# Patient Record
Sex: MALE | Race: WHITE | HISPANIC OR LATINO | URBAN - METROPOLITAN AREA
[De-identification: names, ages, dates, MRNs, and addresses within clinical notes are randomized per-mention and may not be internally consistent; named-entity substitution may affect disease eponyms.]

---

## 2017-01-01 ENCOUNTER — INPATIENT (INPATIENT)
Facility: HOSPITAL | Age: 77
LOS: 7 days | End: 2018-01-08
Attending: INTERNAL MEDICINE | Admitting: HOSPITALIST
Payer: MEDICARE

## 2017-01-01 VITALS
OXYGEN SATURATION: 100 % | TEMPERATURE: 98 F | SYSTOLIC BLOOD PRESSURE: 157 MMHG | RESPIRATION RATE: 18 BRPM | DIASTOLIC BLOOD PRESSURE: 112 MMHG | HEART RATE: 107 BPM

## 2017-01-01 DIAGNOSIS — I10 ESSENTIAL (PRIMARY) HYPERTENSION: ICD-10-CM

## 2017-01-01 DIAGNOSIS — R74.0 NONSPECIFIC ELEVATION OF LEVELS OF TRANSAMINASE AND LACTIC ACID DEHYDROGENASE [LDH]: ICD-10-CM

## 2017-01-01 DIAGNOSIS — E11.9 TYPE 2 DIABETES MELLITUS WITHOUT COMPLICATIONS: ICD-10-CM

## 2017-01-01 DIAGNOSIS — I63.9 CEREBRAL INFARCTION, UNSPECIFIED: ICD-10-CM

## 2017-01-01 DIAGNOSIS — T14.8XXA OTHER INJURY OF UNSPECIFIED BODY REGION, INITIAL ENCOUNTER: ICD-10-CM

## 2017-01-01 DIAGNOSIS — D72.829 ELEVATED WHITE BLOOD CELL COUNT, UNSPECIFIED: ICD-10-CM

## 2017-01-01 DIAGNOSIS — I24.9 ACUTE ISCHEMIC HEART DISEASE, UNSPECIFIED: ICD-10-CM

## 2017-01-01 LAB
ALBUMIN SERPL ELPH-MCNC: 4.2 G/DL — SIGNIFICANT CHANGE UP (ref 3.3–5)
ALBUMIN SERPL ELPH-MCNC: 4.3 G/DL — SIGNIFICANT CHANGE UP (ref 3.3–5)
ALP SERPL-CCNC: 61 U/L — SIGNIFICANT CHANGE UP (ref 40–120)
ALP SERPL-CCNC: 62 U/L — SIGNIFICANT CHANGE UP (ref 40–120)
ALT FLD-CCNC: 105 U/L — HIGH (ref 4–41)
ALT FLD-CCNC: 113 U/L — HIGH (ref 4–41)
APTT BLD: 28.6 SEC — SIGNIFICANT CHANGE UP (ref 27.5–37.4)
AST SERPL-CCNC: 102 U/L — HIGH (ref 4–40)
AST SERPL-CCNC: 86 U/L — HIGH (ref 4–40)
BASE EXCESS BLDV CALC-SCNC: -2.1 MMOL/L — SIGNIFICANT CHANGE UP
BASOPHILS # BLD AUTO: 0.03 K/UL — SIGNIFICANT CHANGE UP (ref 0–0.2)
BASOPHILS NFR BLD AUTO: 0.2 % — SIGNIFICANT CHANGE UP (ref 0–2)
BILIRUB SERPL-MCNC: 1.5 MG/DL — HIGH (ref 0.2–1.2)
BILIRUB SERPL-MCNC: 1.5 MG/DL — HIGH (ref 0.2–1.2)
BLOOD GAS VENOUS - CREATININE: 1.03 MG/DL — SIGNIFICANT CHANGE UP (ref 0.5–1.3)
BUN SERPL-MCNC: 26 MG/DL — HIGH (ref 7–23)
BUN SERPL-MCNC: 29 MG/DL — HIGH (ref 7–23)
CALCIUM SERPL-MCNC: 9.4 MG/DL — SIGNIFICANT CHANGE UP (ref 8.4–10.5)
CALCIUM SERPL-MCNC: 9.5 MG/DL — SIGNIFICANT CHANGE UP (ref 8.4–10.5)
CHLORIDE BLDV-SCNC: 103 MMOL/L — SIGNIFICANT CHANGE UP (ref 96–108)
CHLORIDE SERPL-SCNC: 98 MMOL/L — SIGNIFICANT CHANGE UP (ref 98–107)
CHLORIDE SERPL-SCNC: 99 MMOL/L — SIGNIFICANT CHANGE UP (ref 98–107)
CK MB BLD-MCNC: 14.6 NG/ML — HIGH (ref 1–6.6)
CK MB BLD-MCNC: 17.93 NG/ML — HIGH (ref 1–6.6)
CK MB BLD-MCNC: 6.6 — HIGH (ref 0–2.5)
CK MB BLD-MCNC: 7.1 — HIGH (ref 0–2.5)
CK SERPL-CCNC: 220 U/L — HIGH (ref 30–200)
CK SERPL-CCNC: 254 U/L — HIGH (ref 30–200)
CO2 SERPL-SCNC: 20 MMOL/L — LOW (ref 22–31)
CO2 SERPL-SCNC: 22 MMOL/L — SIGNIFICANT CHANGE UP (ref 22–31)
CREAT SERPL-MCNC: 0.99 MG/DL — SIGNIFICANT CHANGE UP (ref 0.5–1.3)
CREAT SERPL-MCNC: 1.11 MG/DL — SIGNIFICANT CHANGE UP (ref 0.5–1.3)
EOSINOPHIL # BLD AUTO: 0 K/UL — SIGNIFICANT CHANGE UP (ref 0–0.5)
EOSINOPHIL NFR BLD AUTO: 0 % — SIGNIFICANT CHANGE UP (ref 0–6)
GAS PNL BLDV: 137 MMOL/L — SIGNIFICANT CHANGE UP (ref 136–146)
GLUCOSE BLDV-MCNC: 194 — HIGH (ref 70–99)
GLUCOSE SERPL-MCNC: 144 MG/DL — HIGH (ref 70–99)
GLUCOSE SERPL-MCNC: 178 MG/DL — HIGH (ref 70–99)
HBA1C BLD-MCNC: 6.6 % — HIGH (ref 4–5.6)
HCO3 BLDV-SCNC: 21 MMOL/L — SIGNIFICANT CHANGE UP (ref 20–27)
HCT VFR BLD CALC: 49 % — SIGNIFICANT CHANGE UP (ref 39–50)
HCT VFR BLDV CALC: 50.7 % — SIGNIFICANT CHANGE UP (ref 39–51)
HGB BLD-MCNC: 16.3 G/DL — SIGNIFICANT CHANGE UP (ref 13–17)
HGB BLDV-MCNC: 16.6 G/DL — SIGNIFICANT CHANGE UP (ref 13–17)
IMM GRANULOCYTES # BLD AUTO: 0.08 # — SIGNIFICANT CHANGE UP
IMM GRANULOCYTES NFR BLD AUTO: 0.5 % — SIGNIFICANT CHANGE UP (ref 0–1.5)
INR BLD: 1.36 — HIGH (ref 0.88–1.17)
LACTATE BLDV-MCNC: 3.5 MMOL/L — HIGH (ref 0.5–2)
LYMPHOCYTES # BLD AUTO: 1.77 K/UL — SIGNIFICANT CHANGE UP (ref 1–3.3)
LYMPHOCYTES # BLD AUTO: 11 % — LOW (ref 13–44)
MCHC RBC-ENTMCNC: 31.2 PG — SIGNIFICANT CHANGE UP (ref 27–34)
MCHC RBC-ENTMCNC: 33.3 % — SIGNIFICANT CHANGE UP (ref 32–36)
MCV RBC AUTO: 93.9 FL — SIGNIFICANT CHANGE UP (ref 80–100)
MONOCYTES # BLD AUTO: 1.59 K/UL — HIGH (ref 0–0.9)
MONOCYTES NFR BLD AUTO: 9.9 % — SIGNIFICANT CHANGE UP (ref 2–14)
NEUTROPHILS # BLD AUTO: 12.57 K/UL — HIGH (ref 1.8–7.4)
NEUTROPHILS NFR BLD AUTO: 78.4 % — HIGH (ref 43–77)
NRBC # FLD: 0.02 — SIGNIFICANT CHANGE UP
PCO2 BLDV: 44 MMHG — SIGNIFICANT CHANGE UP (ref 41–51)
PH BLDV: 7.34 PH — SIGNIFICANT CHANGE UP (ref 7.32–7.43)
PLATELET # BLD AUTO: 211 K/UL — SIGNIFICANT CHANGE UP (ref 150–400)
PMV BLD: 10.8 FL — SIGNIFICANT CHANGE UP (ref 7–13)
PO2 BLDV: 27 MMHG — LOW (ref 35–40)
POTASSIUM BLDV-SCNC: 4.4 MMOL/L — SIGNIFICANT CHANGE UP (ref 3.4–4.5)
POTASSIUM SERPL-MCNC: 4.2 MMOL/L — SIGNIFICANT CHANGE UP (ref 3.5–5.3)
POTASSIUM SERPL-MCNC: 4.9 MMOL/L — SIGNIFICANT CHANGE UP (ref 3.5–5.3)
POTASSIUM SERPL-SCNC: 4.2 MMOL/L — SIGNIFICANT CHANGE UP (ref 3.5–5.3)
POTASSIUM SERPL-SCNC: 4.9 MMOL/L — SIGNIFICANT CHANGE UP (ref 3.5–5.3)
PROT SERPL-MCNC: 7.4 G/DL — SIGNIFICANT CHANGE UP (ref 6–8.3)
PROT SERPL-MCNC: 7.5 G/DL — SIGNIFICANT CHANGE UP (ref 6–8.3)
PROTHROM AB SERPL-ACNC: 15.2 SEC — HIGH (ref 9.8–13.1)
RBC # BLD: 5.22 M/UL — SIGNIFICANT CHANGE UP (ref 4.2–5.8)
RBC # FLD: 14.7 % — HIGH (ref 10.3–14.5)
SAO2 % BLDV: 35.8 % — LOW (ref 60–85)
SODIUM SERPL-SCNC: 139 MMOL/L — SIGNIFICANT CHANGE UP (ref 135–145)
SODIUM SERPL-SCNC: 140 MMOL/L — SIGNIFICANT CHANGE UP (ref 135–145)
TROPONIN T SERPL-MCNC: 0.31 NG/ML — HIGH (ref 0–0.06)
TROPONIN T SERPL-MCNC: 0.41 NG/ML — HIGH (ref 0–0.06)
WBC # BLD: 16.04 K/UL — HIGH (ref 3.8–10.5)
WBC # FLD AUTO: 16.04 K/UL — HIGH (ref 3.8–10.5)

## 2017-01-01 PROCEDURE — 71020: CPT | Mod: 26

## 2017-01-01 PROCEDURE — 70450 CT HEAD/BRAIN W/O DYE: CPT | Mod: 26

## 2017-01-01 PROCEDURE — 93010 ELECTROCARDIOGRAM REPORT: CPT

## 2017-01-01 RX ORDER — ASPIRIN/CALCIUM CARB/MAGNESIUM 324 MG
324 TABLET ORAL ONCE
Qty: 0 | Refills: 0 | Status: COMPLETED | OUTPATIENT
Start: 2017-01-01 | End: 2017-01-01

## 2017-01-01 RX ORDER — SITAGLIPTIN 50 MG/1
1 TABLET, FILM COATED ORAL
Qty: 0 | Refills: 0 | COMMUNITY

## 2017-01-01 RX ORDER — SODIUM CHLORIDE 9 MG/ML
1000 INJECTION, SOLUTION INTRAVENOUS
Qty: 0 | Refills: 0 | Status: DISCONTINUED | OUTPATIENT
Start: 2017-01-01 | End: 2018-01-01

## 2017-01-01 RX ORDER — SODIUM CHLORIDE 9 MG/ML
1000 INJECTION INTRAMUSCULAR; INTRAVENOUS; SUBCUTANEOUS
Qty: 0 | Refills: 0 | Status: DISCONTINUED | OUTPATIENT
Start: 2017-01-01 | End: 2018-01-01

## 2017-01-01 RX ORDER — DEXTROSE 50 % IN WATER 50 %
1 SYRINGE (ML) INTRAVENOUS ONCE
Qty: 0 | Refills: 0 | Status: DISCONTINUED | OUTPATIENT
Start: 2017-01-01 | End: 2018-01-01

## 2017-01-01 RX ORDER — HEPARIN SODIUM 5000 [USP'U]/ML
5000 INJECTION INTRAVENOUS; SUBCUTANEOUS ONCE
Qty: 0 | Refills: 0 | Status: COMPLETED | OUTPATIENT
Start: 2017-01-01 | End: 2017-01-01

## 2017-01-01 RX ORDER — INSULIN LISPRO 100/ML
VIAL (ML) SUBCUTANEOUS AT BEDTIME
Qty: 0 | Refills: 0 | Status: DISCONTINUED | OUTPATIENT
Start: 2017-01-01 | End: 2018-01-01

## 2017-01-01 RX ORDER — SIMVASTATIN 20 MG/1
1 TABLET, FILM COATED ORAL
Qty: 0 | Refills: 0 | COMMUNITY

## 2017-01-01 RX ORDER — CANAGLIFLOZIN AND METFORMIN HYDROCHLORIDE 50; 500 MG/1; MG/1
1 TABLET, FILM COATED, EXTENDED RELEASE ORAL
Qty: 0 | Refills: 0 | COMMUNITY

## 2017-01-01 RX ORDER — HEPARIN SODIUM 5000 [USP'U]/ML
INJECTION INTRAVENOUS; SUBCUTANEOUS
Qty: 25000 | Refills: 0 | Status: DISCONTINUED | OUTPATIENT
Start: 2017-01-01 | End: 2017-01-01

## 2017-01-01 RX ORDER — GLUCAGON INJECTION, SOLUTION 0.5 MG/.1ML
1 INJECTION, SOLUTION SUBCUTANEOUS ONCE
Qty: 0 | Refills: 0 | Status: DISCONTINUED | OUTPATIENT
Start: 2017-01-01 | End: 2018-01-01

## 2017-01-01 RX ORDER — DEXTROSE 50 % IN WATER 50 %
25 SYRINGE (ML) INTRAVENOUS ONCE
Qty: 0 | Refills: 0 | Status: DISCONTINUED | OUTPATIENT
Start: 2017-01-01 | End: 2018-01-01

## 2017-01-01 RX ORDER — INFLUENZA VIRUS VACCINE 15; 15; 15; 15 UG/.5ML; UG/.5ML; UG/.5ML; UG/.5ML
0.5 SUSPENSION INTRAMUSCULAR ONCE
Qty: 0 | Refills: 0 | Status: DISCONTINUED | OUTPATIENT
Start: 2017-01-01 | End: 2018-01-01

## 2017-01-01 RX ORDER — HEPARIN SODIUM 5000 [USP'U]/ML
5000 INJECTION INTRAVENOUS; SUBCUTANEOUS EVERY 6 HOURS
Qty: 0 | Refills: 0 | Status: DISCONTINUED | OUTPATIENT
Start: 2017-01-01 | End: 2017-01-01

## 2017-01-01 RX ORDER — METOPROLOL TARTRATE 50 MG
50 TABLET ORAL
Qty: 0 | Refills: 0 | Status: DISCONTINUED | OUTPATIENT
Start: 2017-01-01 | End: 2018-01-01

## 2017-01-01 RX ORDER — HEPARIN SODIUM 5000 [USP'U]/ML
5000 INJECTION INTRAVENOUS; SUBCUTANEOUS EVERY 12 HOURS
Qty: 0 | Refills: 0 | Status: DISCONTINUED | OUTPATIENT
Start: 2017-01-01 | End: 2018-01-01

## 2017-01-01 RX ORDER — DEXTROSE 50 % IN WATER 50 %
12.5 SYRINGE (ML) INTRAVENOUS ONCE
Qty: 0 | Refills: 0 | Status: DISCONTINUED | OUTPATIENT
Start: 2017-01-01 | End: 2018-01-01

## 2017-01-01 RX ORDER — SIMVASTATIN 20 MG/1
40 TABLET, FILM COATED ORAL AT BEDTIME
Qty: 0 | Refills: 0 | Status: DISCONTINUED | OUTPATIENT
Start: 2017-01-01 | End: 2018-01-01

## 2017-01-01 RX ORDER — ASPIRIN/CALCIUM CARB/MAGNESIUM 324 MG
1 TABLET ORAL
Qty: 0 | Refills: 0 | COMMUNITY

## 2017-01-01 RX ORDER — ASPIRIN/CALCIUM CARB/MAGNESIUM 324 MG
81 TABLET ORAL DAILY
Qty: 0 | Refills: 0 | Status: DISCONTINUED | OUTPATIENT
Start: 2017-01-01 | End: 2018-01-01

## 2017-01-01 RX ORDER — INSULIN LISPRO 100/ML
VIAL (ML) SUBCUTANEOUS
Qty: 0 | Refills: 0 | Status: DISCONTINUED | OUTPATIENT
Start: 2017-01-01 | End: 2018-01-01

## 2017-01-01 RX ORDER — METOPROLOL TARTRATE 50 MG
1 TABLET ORAL
Qty: 0 | Refills: 0 | COMMUNITY

## 2017-01-01 RX ADMIN — HEPARIN SODIUM 1000 UNIT(S)/HR: 5000 INJECTION INTRAVENOUS; SUBCUTANEOUS at 14:35

## 2017-01-01 RX ADMIN — HEPARIN SODIUM 5000 UNIT(S): 5000 INJECTION INTRAVENOUS; SUBCUTANEOUS at 14:35

## 2017-01-01 RX ADMIN — HEPARIN SODIUM 5000 UNIT(S): 5000 INJECTION INTRAVENOUS; SUBCUTANEOUS at 18:55

## 2017-01-01 RX ADMIN — Medication 324 MILLIGRAM(S): at 14:20

## 2017-01-01 RX ADMIN — SODIUM CHLORIDE 50 MILLILITER(S): 9 INJECTION INTRAMUSCULAR; INTRAVENOUS; SUBCUTANEOUS at 23:30

## 2017-12-31 NOTE — CHART NOTE - NSCHARTNOTEFT_GEN_A_CORE
Discussed case with cardiology, will hold off on heparin gtt for now since CK and CKMB trending down and EKG is stable. Pt remains chest pain free. Will continue to trend CE.

## 2017-12-31 NOTE — ED ADULT NURSE NOTE - OBJECTIVE STATEMENT
Pt received to room 3, As per family at bedside pt fell, landing onto the bed, denies LOC or hitting his head on Thursday, pt refused to come to ED. Pt complaining of intermittent periods of chest pain, denies any at this time. Pt noted to have slurred speech, as per family they noticed slurred speech since Thursday. Pt placed on cardiac monitor. IV access obtained, labs drawn and sent.

## 2017-12-31 NOTE — ED PROVIDER NOTE - MEDICAL DECISION MAKING DETAILS
77M s/p fall with ataxia and dizziness, r/o stroke, electrolyte abnormalities. check labs, ekg, head ct.

## 2017-12-31 NOTE — CONSULT NOTE ADULT - SUBJECTIVE AND OBJECTIVE BOX
Date of Admission:    CHIEF COMPLAINT:    HISTORY OF PRESENT ILLNESS:      Allergies    No Known Allergies    Intolerances    	    MEDICATIONS:  heparin  Infusion.  Unit(s)/Hr IV Continuous <Continuous>  heparin  Injectable 5000 Unit(s) IV Push every 6 hours PRN                  PAST MEDICAL & SURGICAL HISTORY:  DM (diabetes mellitus)  HTN (hypertension)  No significant past surgical history      FAMILY HISTORY:  No pertinent family history in first degree relatives      SOCIAL HISTORY:    [ ] Non-smoker  [ ] Smoker  [ ] Alcohol      REVIEW OF SYSTEMS:  See HPI. Otherwise, 10 point ROS done and otherwise negative.    PHYSICAL EXAM:  T(C): 37.1 (12-31-17 @ 13:02), Max: 37.1 (12-31-17 @ 13:02)  HR: 100 (12-31-17 @ 13:02) (100 - 107)  BP: 161/105 (12-31-17 @ 13:02) (157/112 - 161/105)  RR: 20 (12-31-17 @ 13:02) (18 - 20)  SpO2: 99% (12-31-17 @ 13:02) (99% - 100%)  Wt(kg): --  I&O's Summary      Appearance: Normal	  HEENT:   Normal oral mucosa, PERRL, EOMI	  Lymphatic: No lymphadenopathy  Cardiovascular: Normal S1 S2, No JVD, No murmurs, No edema  Respiratory: Lungs clear to auscultation	  Psychiatry: A & O x 3, Mood & affect appropriate  Gastrointestinal:  Soft, Non-tender, + BS	  Skin: No rashes, No ecchymoses, No cyanosis	  Neurologic: Non-focal  Extremities: Normal range of motion, No clubbing, cyanosis or edema  Vascular: Peripheral pulses palpable 2+ bilaterally        LABS:	 	    CBC Full  -  ( 31 Dec 2017 13:01 )  WBC Count : 16.04 K/uL  Hemoglobin : 16.3 g/dL  Hematocrit : 49.0 %  Platelet Count - Automated : 211 K/uL  Mean Cell Volume : 93.9 fL  Mean Cell Hemoglobin : 31.2 pg  Mean Cell Hemoglobin Concentration : 33.3 %  Auto Neutrophil # : 12.57 K/uL  Auto Lymphocyte # : 1.77 K/uL  Auto Monocyte # : 1.59 K/uL  Auto Eosinophil # : 0.00 K/uL  Auto Basophil # : 0.03 K/uL  Auto Neutrophil % : 78.4 %  Auto Lymphocyte % : 11.0 %  Auto Monocyte % : 9.9 %  Auto Eosinophil % : 0.0 %  Auto Basophil % : 0.2 %    12-31    139  |  98  |  29<H>  ----------------------------<  178<H>  4.9   |  20<L>  |  1.11    Ca    9.4      31 Dec 2017 13:01    TPro  7.5  /  Alb  4.3  /  TBili  1.5<H>  /  DBili  x   /  AST  102<H>  /  ALT  113<H>  /  AlkPhos  61  12-31    < from: CT Head No Cont (12.31.17 @ 13:56) >    IMPRESSION:   Hypodensity within the left cerebellum may represent an acute to subacute   infarct.    Findings discussed with Dr. Beckman by Dr. Parrish on 12/31/2017   at 2:06 PM with read back.      < end of copied text > Date of Admission:  12/31/17  CHIEF COMPLAINT: altered mental status, slurred speech, dizziness  HISTORY OF PRESENT ILLNESS:  · HPI Objective Statement: 77M with hx of HTN, DM, HLD p/w slurred speech, ataxia, dizziness and falls since Thursday. Patient states he fell from bed on Thursday and since then he has had slurred speech and multiple falls at home. Patient states he was unable to get up from fall on Thursday secondary to dizziness. Reports feeling like his stomach is weak and hasn't been eating much. no vomiting or diarrhea, no fever or chills.	  Allergies    No Known Allergies    Intolerances    	    MEDICATIONS:  heparin  Infusion.  Unit(s)/Hr IV Continuous <Continuous>  heparin  Injectable 5000 Unit(s) IV Push every 6 hours PRN    PAST MEDICAL & SURGICAL HISTORY:  DM (diabetes mellitus)  HTN (hypertension)  No significant past surgical history      FAMILY HISTORY:  No pertinent family history in first degree relatives      SOCIAL HISTORY:    [ ] Non-smoker  [ ] Smoker  [ ] Alcohol      REVIEW OF SYSTEMS:  See HPI. Otherwise, 10 point ROS done and otherwise negative.    PHYSICAL EXAM:  T(C): 37.1 (12-31-17 @ 13:02), Max: 37.1 (12-31-17 @ 13:02)  HR: 100 (12-31-17 @ 13:02) (100 - 107)  BP: 161/105 (12-31-17 @ 13:02) (157/112 - 161/105)  RR: 20 (12-31-17 @ 13:02) (18 - 20)  SpO2: 99% (12-31-17 @ 13:02) (99% - 100%)  Wt(kg): --  I&O's Summary      Appearance: Normal	  HEENT:   Normal oral mucosa, PERRL, EOMI	  Lymphatic: No lymphadenopathy  Cardiovascular: Normal S1 S2, No JVD, No murmurs, No edema  Respiratory: Lungs clear to auscultation	  Psychiatry: A & O x 3, Mood & affect appropriate  Gastrointestinal:  Soft, Non-tender, + BS	  Skin: No rashes, No ecchymoses, No cyanosis	  Neurologic: Non-focal  Extremities: Normal range of motion, No clubbing, cyanosis or edema  Vascular: Peripheral pulses palpable 2+ bilaterally        LABS:	 	    CBC Full  -  ( 31 Dec 2017 13:01 )  WBC Count : 16.04 K/uL  Hemoglobin : 16.3 g/dL  Hematocrit : 49.0 %  Platelet Count - Automated : 211 K/uL  Mean Cell Volume : 93.9 fL  Mean Cell Hemoglobin : 31.2 pg  Mean Cell Hemoglobin Concentration : 33.3 %  Auto Neutrophil # : 12.57 K/uL  Auto Lymphocyte # : 1.77 K/uL  Auto Monocyte # : 1.59 K/uL  Auto Eosinophil # : 0.00 K/uL  Auto Basophil # : 0.03 K/uL  Auto Neutrophil % : 78.4 %  Auto Lymphocyte % : 11.0 %  Auto Monocyte % : 9.9 %  Auto Eosinophil % : 0.0 %  Auto Basophil % : 0.2 %    12-31    139  |  98  |  29<H>  ----------------------------<  178<H>  4.9   |  20<L>  |  1.11    Ca    9.4      31 Dec 2017 13:01    TPro  7.5  /  Alb  4.3  /  TBili  1.5<H>  /  DBili  x   /  AST  102<H>  /  ALT  113<H>  /  AlkPhos  61  12-31    < from: CT Head No Cont (12.31.17 @ 13:56) >    IMPRESSION:   Hypodensity within the left cerebellum may represent an acute to subacute   infarct.    Findings discussed with Dr. Beckman by Dr. Parrish on 12/31/2017   at 2:06 PM with read back.      < end of copied text >

## 2017-12-31 NOTE — H&P ADULT - PROBLEM SELECTOR PLAN 1
Neuro eval appreciated. Will obtain further imaging with MRI/MRA. OK to keep normotensive since sx started 4 days ago.  ASA 81 mg  Uptitrate statin, check FLP  Will check dysphagia screen  Official Swallow eval ordered  PT/OT  Neuro checks q 4

## 2017-12-31 NOTE — CONSULT NOTE ADULT - SUBJECTIVE AND OBJECTIVE BOX
HPI:    77M with hx of HTN, DM, HLD p/w slurred speech, ataxia, dizziness and falls since Thursday. Patient states he fell from bed on Thursday. As per daughter, she noticed a change in speech since Thursday. She noticed that father lost his balance while walking and and had fall multiple times since then. He also had chest pain on Thursday and Friday.  Denied any chest pain currently.  He denied any change in vision, weakness and numbness.     His NIHSS was 2 and MRS was 1.       MEDICATIONS  (STANDING):  heparin  Infusion.  Unit(s)/Hr (10 mL/Hr) IV Continuous <Continuous>    MEDICATIONS  (PRN):  heparin  Injectable 5000 Unit(s) IV Push every 6 hours PRN For aPTT less than 40      PAST MEDICAL & SURGICAL HISTORY:  DM (diabetes mellitus)  HTN (hypertension)  No significant past surgical history      FAMILY HISTORY:  No pertinent family history in first degree relatives      Allergies    No Known Allergies    Intolerances    SHx - No smoking, No ETOH, No drug abuse      Review of Systems:  CONSTITUTIONAL:  No weight loss, fever, chills, weakness or fatigue.  HEENT:  Eyes:  No visual loss, blurred vision, double vision or yellow sclerae. Ears, Nose, Throat:  No hearing loss, sneezing, congestion, runny nose or sore throat.  SKIN:  No rash or itching.  CARDIOVASCULAR:  No chest pain, chest pressure or chest discomfort. No palpitations or edema.  RESPIRATORY:  No shortness of breath, cough or sputum.  GASTROINTESTINAL:  c/o abdominal pain   GENITOURINARY:  NO Burning on urination.   NEUROLOGICAL: See HPI  MUSCULOSKELETAL:  No muscle, back pain, joint pain or stiffness.  HEMATOLOGIC:  No anemia, bleeding or bruising.  LYMPHATICS:  No enlarged nodes. No history of splenectomy.  PSYCHIATRIC:  No history of depression or anxiety.  ENDOCRINOLOGIC:  No reports of sweating, cold or heat intolerance. No polyuria or polydipsia.  ALLERGIES:  No history of asthma, hives, eczema or rhinitis.      Vital Signs Last 24 Hrs  T(C): 36.7 (31 Dec 2017 15:21), Max: 37.1 (31 Dec 2017 13:02)  T(F): 98 (31 Dec 2017 15:21), Max: 98.7 (31 Dec 2017 13:02)  HR: 108 (31 Dec 2017 15:21) (100 - 108)  BP: 159/90 (31 Dec 2017 15:21) (157/112 - 161/105)  BP(mean): --  RR: 16 (31 Dec 2017 15:21) (16 - 20)  SpO2: 100% (31 Dec 2017 15:21) (99% - 100%)    General Exam:   General appearance: No acute distress                   Neurological Exam:    Mental Status: Orientated to self, date and place.   dysarthria noted- improving now as per daughter, no aphasia or neglect.  Cranial Nerves: b/l cataract surgery, EOMI, CN V1-3 intact to light touch and pinprick.  No facial asymmetry, Tongue, uvula and palate midline.      Motor:   Tone: normal.                  Strength:     Upper extremity           Lower extremity                       HF          KE          KF        DF         PF                                               R        5/5        5/5        5/5       5/5       5/5                                               L         5/5        5/5       5/5       5/5        5/5  Pronator drift: none                 Dysmetria: positive for FTN on left arm and HTS on left leg   Tremor: No resting, postural or action tremor.  No myoclonus.  Sensation: intact to light touch, decreased vibration in lower extremities   Deep Tendon Reflexes: 1+ bilateral biceps, triceps, brachioradialis, knee   Gait: deferred     Other:    12-31    139  |  98  |  29<H>  ----------------------------<  178<H>  4.9   |  20<L>  |  1.11    Ca    9.4      31 Dec 2017 13:01    TPro  7.5  /  Alb  4.3  /  TBili  1.5<H>  /  DBili  x   /  AST  102<H>  /  ALT  113<H>  /  AlkPhos  61  12-31 12-31    139  |  98  |  29<H>  ----------------------------<  178<H>  4.9   |  20<L>  |  1.11    Ca    9.4      31 Dec 2017 13:01    TPro  7.5  /  Alb  4.3  /  TBili  1.5<H>  /  DBili  x   /  AST  102<H>  /  ALT  113<H>  /  AlkPhos  61  12-31                          16.3   16.04 )-----------( 211      ( 31 Dec 2017 13:01 )             49.0       Radiology    CT head     < from: CT Head No Cont (12.31.17 @ 13:56) >  IMPRESSION:   Hypodensity within the left cerebellum may represent an acute to subacute   infarct.    < end of copied text >

## 2017-12-31 NOTE — CONSULT NOTE ADULT - ASSESSMENT
77M with hx of HTN, DM, HLD p/w slurred speech, ataxia, dizziness and falls since Thursday. His symptoms are improving. He was also found to have acute MI. Neurological examination showed mild dysarthria, abnormal FTN and HTS on left side. CT head showed acute to subacute cerebellar stroke in left cerebellum.     Impression     Acute to subacute cerebellar stroke    Plan    BP goal : normotensive   MRI brain w/o cont  MRA brain w/o cont   MRA neck w/ cont  HgA1c  Lipid profile  ASA 81mg  IV heparin if required can be used without any boluses and with PTT goal between 50-60.   Atorvastatin 80 mg  PT/OT  TTE  Telemetry monitoring

## 2017-12-31 NOTE — H&P ADULT - HISTORY OF PRESENT ILLNESS
77m with hx DM, HTN, HLD, remote CVA with no residual a/w multiple episodes of dizziness and falls for past 4 days along with slurred speech. Daughter and wife at bedside also report pt experienced trouble swallowing and "not acting like himself." Daughter reports similar episode in 2013 , states pt experienced a stroke and MI at the same time.    Pt also reports episodes of exertional SOB. Pt denies LOC/headache/nausea/vomiting/chest pain. No known recent cardiac workup. 77m with hx DM, HTN, HLD, remote CVA with no residual a/w multiple episodes of dizziness and falls for past 4 days along with slurred speech. Daughter and wife at bedside also report pt experienced trouble swallowing and "not acting like himself." Daughter reports similar episode in 2013 , states pt experienced a stroke and MI at the same time.    Pt also reports episodes of exertional SOB and chest pain last two days. Pt denies LOC/headache/nausea/vomiting. No known recent cardiac workup.

## 2017-12-31 NOTE — ED PROVIDER NOTE - OBJECTIVE STATEMENT
77M with hx of HTN, DM, HLD p/w slurred speech, ataxia, dizziness and falls since Thursday. Patient states he fell from bed on Thursday and since then he has had slurred speech and multiple falls at home. Patient states he was unable to get up from fall on Thursday secondary to dizziness. Reports feeling like his stomach is weak and hasn't been eating much. no vomiting or diarrhea, no fever or chills., 77M with hx of HTN, DM, HLD p/w slurred speech, ataxia, dizziness and falls since Thursday. Patient states he fell from bed on Thursday and since then he has had slurred speech and multiple falls at home. Patient states he was unable to get up from fall on Thursday secondary to dizziness. Reports feeling like his stomach is weak and hasn't been eating much. no vomiting or diarrhea, no fever or chills.

## 2017-12-31 NOTE — ED PROVIDER NOTE - ATTENDING CONTRIBUTION TO CARE
ED Attending Dr. Barron: 78 yo male with HTN, DM, HLD in ED with dizziness, ataxia and slurred speech for 4 days.  Accompanied by falls at home.  EMS was called when symptoms began 4 days ago, but pt declined to go to hospital at that time.  Came to ED today due to continued symptoms.  Pt denies CP/SOB, N/V/D or abdominal pain.  On exam pt overall well appearing, in NAD, heart RRR, lungs CTAB, abd NTND, extremities without swelling, strength 5/5 in all extremities except 4+/5 in right LE and skin without rash (although healing anterior LE abrasions s/p falls).

## 2017-12-31 NOTE — ED PROVIDER NOTE - PROGRESS NOTE DETAILS
Rk: d/w radiology, right sided cerebellar infarct noted. Rk: d/w radiology, LEFT sided cerebellar infarct noted, acute-subacute.

## 2017-12-31 NOTE — H&P ADULT - PROBLEM SELECTOR PLAN 2
Continue to trend Cardiac enzymes  Will eventually need ischemic eval, continue ASA.    Admit to tele  Check echo

## 2017-12-31 NOTE — H&P ADULT - PROBLEM SELECTOR PLAN 5
? reactive. No s/s infections  Will monitor. Repeat in am with diff ? reactive. No s/s infections  Will monitor. Repeat in am with diff  If spikes temp overnight , start Unasyn for possible aspiration ( pt has been eating despite trouble swallowing)

## 2017-12-31 NOTE — ED ADULT TRIAGE NOTE - CHIEF COMPLAINT QUOTE
fs 176  . pt reports dizziness since thurs,decreased appetite. intermittent ch pains since thurs.    pt vomited  fri,sat.  pt reports increased thrist,weakness, increased diff breathing

## 2017-12-31 NOTE — CONSULT NOTE ADULT - ASSESSMENT
Patient is a 77 year old male with PMH of HTN, HLD, DM presents with acute stroke left cerebellum, patient reports chest pain with SOB and dizziness at home x 4 days at home. In ED, CT scan of head reveals acute stroke, patient with elevated troponin and +CPK +CKMB. EKG with sinus tachycardia, LVH, J-point elevation V2-V3, TWI in V4 V5 V6. Plan: continue with serial cardiac enzymes. Would recommend input from neurology regarding anticoagulation risk with acute stroke. Continue aspirin for now. Further ischemic work up is recommended. ECHO in  the am, trend cardiac enzymes, monitor on tele.  Possible nuc stress test when hemodynamically stable. Plan discussed with  Patient is a 77 year old male with PMH of HTN, HLD, DM presents with acute stroke left cerebellum, patient reports chest pain with SOB and dizziness at home x 4 days at home. In ED, CT scan of head reveals acute stroke, patient with elevated troponin and +CPK +CKMB. EKG with sinus tachycardia, LVH, J-point elevation V2-V3, TWI in V4 V5 V6. Plan: continue with serial cardiac enzymes. Would recommend input from neurology regarding anticoagulation risk with acute stroke. Continue aspirin for now. Would hold off on heparin gtt for now. Further ischemic work up is recommended. ECHO in  the am, trend cardiac enzymes, monitor on tele.  Possible nuc stress test when hemodynamically stable. Plan discussed with  Patient is a 77 year old male with PMH of HTN, HLD, DM presents with acute stroke left cerebellum, patient reports he had chest pain 4 days ago at home, no current complaints of chest pain. In ED, CT scan of head reveals acute stroke, patient with elevated troponin and +CPK +CKMB. EKG with sinus tachycardia, LVH, J-point elevation V2-V3, TWI in V4 V5 V6. Plan: continue with serial cardiac enzymes. Would recommend input from neurology regarding anticoagulation risk with acute stroke. Continue aspirin for now. Would hold off on heparin gtt for now. Further ischemic work up is recommended. ECHO in  the am, trend cardiac enzymes, monitor on tele.  Possible nuc stress test when hemodynamically stable. Plan discussed with

## 2017-12-31 NOTE — ED PROVIDER NOTE - CARE PLAN
Principal Discharge DX:	Cerebrovascular accident (CVA), unspecified mechanism  Secondary Diagnosis:	ACS (acute coronary syndrome) Principal Discharge DX:	CVA (cerebral vascular accident)  Secondary Diagnosis:	ACS (acute coronary syndrome)

## 2017-12-31 NOTE — H&P ADULT - ATTENDING COMMENTS
In brief, this is a 77y/M with PMH of DM, HTN, HLD, remote CVA x 2012 with no residual a/w  dizziness, falls,  slurred speech. for past 4 days Daughter called 911 x 2 on Thursday and Friday but pt refused to go to the ER. Today, the family insisted on him going to the ER as they were not able to manage him at home. They also noted difficulty eating  At baseline, pt not able to ambulate much due to SOB x 3-4 year, He uses a cane and can walk only a few feet.  Since the onset of symptoms on Thursday, he was unsteady and was leaning to a side  pt also c/o chest pain/SOB 4 days back  Denies  CP/SOB  at present  In the ED, CT head showed hypodensity within the left cerebellum may represent an acute to subacute infarct.  Labs were significant for elevated troponin with EKG changes significant sinus tachycardia, LVH,  TWI in V4 V5 V6.  Pt was started on heparin gtt in ED  for possible ACS and admitted for CVA work up  1) Acute CVA with dysarthia, dysmetria, ataxia. CT head  with hypodensity within the left cerebellum may represent an acute to subacute infarct.  DW Neuro - recommend ASA/Statin/MRI/MRA/TTE  Speech/swallow/PT eval  2) NSTEMI in setting of acute CVA- [facility] Cardiology - recommend to hold off heparin given low possibility of ACS and risk of  hhgic conversion .  Tren troponin/serial EKGS  If pt develops CP or any signs of ischemia, low threshold to start Heparin gtt  DW Neuro - ok to start Heparin if needed for ACS, maintain low PTT and no bolus  continue ASA/Metoprolol/Enalapril  3) HTN- Continue Enalapril/Metoprolol  4) Type II DM with diabetic retinopathy ( as per pts daughter) - continue SSS  5) DVT prox- HSQ In brief, this is a 77y/M with PMH of DM, HTN, HLD, remote CVA x 2012 with no residual a/w  dizziness, falls,  slurred speech. for past 4 days Daughter called 911 x 2 on Thursday and Friday but pt refused to go to the ER. Today, the family insisted on him going to the ER as they were not able to manage him at home. They also noted difficulty eating  At baseline, pt not able to ambulate much due to SOB x 3-4 year, He uses a cane and can walk only a few feet.  Since the onset of symptoms on Thursday, he was unsteady and was leaning to a side  pt also c/o chest pain/SOB 4 days back  Denies  CP/SOB  at present  In the ED, CT head showed hypodensity within the left cerebellum may represent an acute to subacute infarct.  Labs were significant for elevated troponin with EKG changes significant sinus tachycardia, LVH,  TWI in V4 V5 V6.  Pt was started on heparin gtt in ED  for possible ACS and admitted for CVA work up  1) Acute CVA with dysarthia, dysmetria, ataxia. CT head  with hypodensity within the left cerebellum may represent an acute to subacute infarct.  DW Neuro - recommend ASA/Statin/MRI/MRA/TTE  Speech/swallow/PT eval  2) NSTEMI in setting of acute CVA- [facility] Cardiology - recommend to hold off heparin given low possibility of ACS and risk of  hhgic conversion .  Tren troponin/serial EKGS  If pt develops CP or any signs of ischemia, low threshold to start Heparin gtt  DW Neuro - ok to start Heparin if needed for ACS, maintain low PTT and no bolus  continue ASA/Metoprolol/Enalapril  3) HTN- Continue Enalapril/Metoprolol  4) Type II DM with diabetic retinopathy ( as per pts daughter) - continue SSS  5) Leukocytosis- likely reactive. no overt signs of infection. monitor for Aspiration. If febrile, consider starting emipiric Abx   6) Transaminitis- Denies N/V/Abdominal pain. will continue to monitor for now   7) DVT prox- HSQ In brief, this is a 77y/M with PMH of DM, HTN, HLD, remote CVA x 2012 with no residual a/w  dizziness, falls,  slurred speech. for past 4 days Daughter called 911 x 2 on Thursday and Friday but pt refused to go to the ER. Today, the family insisted on him going to the ER as they were not able to manage him at home. They also noted difficulty eating  At baseline, pt not able to ambulate much due to SOB x 3-4 year, He uses a cane and can walk only a few feet.  Since the onset of symptoms on Thursday, he was unsteady and was leaning to a side  pt also c/o chest pain/SOB 4 days back  Denies  CP/SOB  at present  In the ED, CT head showed hypodensity within the left cerebellum may represent an acute to subacute infarct.  Labs were significant for elevated troponin with EKG changes significant sinus tachycardia, LVH,  TWI in V4 V5 V6.  Pt was started on heparin gtt in ED  for possible ACS and admitted for CVA work up  1) Acute CVA with dysarthia, dysmetria, ataxia. CT head  with hypodensity within the left cerebellum may represent an acute to subacute infarct.  DW Neuro - recommend ASA/Statin/MRI/MRA/TTE  Speech/swallow/PT eval  2) NSTEMI in setting of acute CVA- [facility] Cardiology - recommend to hold off heparin given low possibility of ACS and risk of  hhgic conversion of CVA  Trend  troponin/serial EKGS  If pt develops CP or any signs of ischemia, low threshold to start Heparin gtt  DW Neuro - ok to start Heparin if needed for ACS, maintain low PTT and no bolus  continue ASA/Metoprolol/Enalapril  3) HTN- Continue Enalapril/Metoprolol  4) Type II DM with diabetic retinopathy ( as per pts daughter) - continue SSS  5) Leukocytosis- likely reactive. no overt signs of infection. monitor for Aspiration. If febrile, consider starting emipiric Abx   6) Transaminitis- Denies N/V/Abdominal pain. will continue to monitor for now   7) DVT prox- HSQ

## 2017-12-31 NOTE — ED PROVIDER NOTE - ENMT, MLM
Airway patent, Nasal mucosa clear. Mouth with normal mucosa. Throat has no vesicles, no oropharyngeal exudates and uvula is midline. Tongue deviation to right.

## 2017-12-31 NOTE — H&P ADULT - NEUROLOGICAL DETAILS
normal strength/speech slightly slurred but better as per daughter/sensation intact/cranial nerves intact/strength decreased/alert and oriented x 3

## 2017-12-31 NOTE — H&P ADULT - PROBLEM SELECTOR PLAN 6
Abd exam without tenderness. pt denies pain  WIll trend for now  if uptrending in am will obtain RUQ sono

## 2018-01-01 DIAGNOSIS — N17.9 ACUTE KIDNEY FAILURE, UNSPECIFIED: ICD-10-CM

## 2018-01-01 DIAGNOSIS — Z29.9 ENCOUNTER FOR PROPHYLACTIC MEASURES, UNSPECIFIED: ICD-10-CM

## 2018-01-01 DIAGNOSIS — R74.8 ABNORMAL LEVELS OF OTHER SERUM ENZYMES: ICD-10-CM

## 2018-01-01 DIAGNOSIS — K72.00 ACUTE AND SUBACUTE HEPATIC FAILURE WITHOUT COMA: ICD-10-CM

## 2018-01-01 DIAGNOSIS — R26.9 UNSPECIFIED ABNORMALITIES OF GAIT AND MOBILITY: ICD-10-CM

## 2018-01-01 LAB
ALBUMIN SERPL ELPH-MCNC: 2.6 G/DL — LOW (ref 3.3–5)
ALBUMIN SERPL ELPH-MCNC: 2.8 G/DL — LOW (ref 3.3–5)
ALBUMIN SERPL ELPH-MCNC: 3.1 G/DL — LOW (ref 3.3–5)
ALBUMIN SERPL ELPH-MCNC: 3.2 G/DL — LOW (ref 3.3–5)
ALBUMIN SERPL ELPH-MCNC: 3.2 G/DL — LOW (ref 3.3–5)
ALBUMIN SERPL ELPH-MCNC: 3.3 G/DL — SIGNIFICANT CHANGE UP (ref 3.3–5)
ALBUMIN SERPL ELPH-MCNC: 3.4 G/DL — SIGNIFICANT CHANGE UP (ref 3.3–5)
ALBUMIN SERPL ELPH-MCNC: 3.4 G/DL — SIGNIFICANT CHANGE UP (ref 3.3–5)
ALBUMIN SERPL ELPH-MCNC: 3.6 G/DL — SIGNIFICANT CHANGE UP (ref 3.3–5)
ALBUMIN SERPL ELPH-MCNC: 3.7 G/DL — SIGNIFICANT CHANGE UP (ref 3.3–5)
ALBUMIN SERPL ELPH-MCNC: 3.8 G/DL — SIGNIFICANT CHANGE UP (ref 3.3–5)
ALBUMIN SERPL ELPH-MCNC: 4 G/DL — SIGNIFICANT CHANGE UP (ref 3.3–5)
ALP SERPL-CCNC: 54 U/L — SIGNIFICANT CHANGE UP (ref 40–120)
ALP SERPL-CCNC: 55 U/L — SIGNIFICANT CHANGE UP (ref 40–120)
ALP SERPL-CCNC: 58 U/L — SIGNIFICANT CHANGE UP (ref 40–120)
ALP SERPL-CCNC: 60 U/L — SIGNIFICANT CHANGE UP (ref 40–120)
ALP SERPL-CCNC: 60 U/L — SIGNIFICANT CHANGE UP (ref 40–120)
ALP SERPL-CCNC: 62 U/L — SIGNIFICANT CHANGE UP (ref 40–120)
ALP SERPL-CCNC: 62 U/L — SIGNIFICANT CHANGE UP (ref 40–120)
ALP SERPL-CCNC: 63 U/L — SIGNIFICANT CHANGE UP (ref 40–120)
ALP SERPL-CCNC: 63 U/L — SIGNIFICANT CHANGE UP (ref 40–120)
ALP SERPL-CCNC: 65 U/L — SIGNIFICANT CHANGE UP (ref 40–120)
ALP SERPL-CCNC: 66 U/L — SIGNIFICANT CHANGE UP (ref 40–120)
ALP SERPL-CCNC: 73 U/L — SIGNIFICANT CHANGE UP (ref 40–120)
ALT FLD-CCNC: 110 U/L — HIGH (ref 4–41)
ALT FLD-CCNC: 1110 U/L — HIGH (ref 4–41)
ALT FLD-CCNC: 1127 U/L — HIGH (ref 4–41)
ALT FLD-CCNC: 1249 U/L — HIGH (ref 4–41)
ALT FLD-CCNC: 1338 U/L — HIGH (ref 4–41)
ALT FLD-CCNC: 1743 U/L — HIGH (ref 4–41)
ALT FLD-CCNC: 2027 U/L — HIGH (ref 4–41)
ALT FLD-CCNC: 377 U/L — HIGH (ref 4–41)
ALT FLD-CCNC: 568 U/L — HIGH (ref 4–41)
ALT FLD-CCNC: 696 U/L — HIGH (ref 4–41)
ALT FLD-CCNC: 939 U/L — HIGH (ref 4–41)
ALT FLD-CCNC: 953 U/L — HIGH (ref 4–41)
AMMONIA BLD-MCNC: 35 UMOL/L — SIGNIFICANT CHANGE UP (ref 11–55)
ANA TITR SER: NEGATIVE — SIGNIFICANT CHANGE UP
ANISOCYTOSIS BLD QL: SLIGHT — SIGNIFICANT CHANGE UP
ANISOCYTOSIS BLD QL: SLIGHT — SIGNIFICANT CHANGE UP
APAP SERPL-MCNC: < 15 UG/ML — LOW (ref 15–25)
APPEARANCE UR: CLEAR — SIGNIFICANT CHANGE UP
APTT BLD: 31.7 SEC — SIGNIFICANT CHANGE UP (ref 27.5–37.4)
APTT BLD: 34 SEC — SIGNIFICANT CHANGE UP (ref 27.5–37.4)
APTT BLD: 34.2 SEC — SIGNIFICANT CHANGE UP (ref 27.5–37.4)
APTT BLD: 35.1 SEC — SIGNIFICANT CHANGE UP (ref 27.5–37.4)
APTT BLD: 35.6 SEC — SIGNIFICANT CHANGE UP (ref 27.5–37.4)
APTT BLD: 35.7 SEC — SIGNIFICANT CHANGE UP (ref 27.5–37.4)
APTT BLD: 39.8 SEC — HIGH (ref 27.5–37.4)
APTT BLD: 41.6 SEC — HIGH (ref 27.5–37.4)
APTT BLD: 46.6 SEC — HIGH (ref 27.5–37.4)
AST SERPL-CCNC: 167 U/L — HIGH (ref 4–40)
AST SERPL-CCNC: 1770 U/L — HIGH (ref 4–40)
AST SERPL-CCNC: 2028 U/L — HIGH (ref 4–40)
AST SERPL-CCNC: 230 U/L — HIGH (ref 4–40)
AST SERPL-CCNC: 2629 U/L — HIGH (ref 4–40)
AST SERPL-CCNC: 271 U/L — HIGH (ref 4–40)
AST SERPL-CCNC: 330 U/L — HIGH (ref 4–40)
AST SERPL-CCNC: 380 U/L — HIGH (ref 4–40)
AST SERPL-CCNC: 618 U/L — HIGH (ref 4–40)
AST SERPL-CCNC: 753 U/L — HIGH (ref 4–40)
AST SERPL-CCNC: 89 U/L — HIGH (ref 4–40)
AST SERPL-CCNC: 974 U/L — HIGH (ref 4–40)
BARBITURATES MEASUREMENT: NEGATIVE — SIGNIFICANT CHANGE UP
BASE EXCESS BLDA CALC-SCNC: -6.5 MMOL/L — SIGNIFICANT CHANGE UP
BASE EXCESS BLDA CALC-SCNC: 2.7 MMOL/L — SIGNIFICANT CHANGE UP
BASE EXCESS BLDA CALC-SCNC: 3.6 MMOL/L — SIGNIFICANT CHANGE UP
BASE EXCESS BLDV CALC-SCNC: -2.6 MMOL/L — SIGNIFICANT CHANGE UP
BASOPHILS # BLD AUTO: 0.02 K/UL — SIGNIFICANT CHANGE UP (ref 0–0.2)
BASOPHILS # BLD AUTO: 0.03 K/UL — SIGNIFICANT CHANGE UP (ref 0–0.2)
BASOPHILS # BLD AUTO: 0.04 K/UL — SIGNIFICANT CHANGE UP (ref 0–0.2)
BASOPHILS # BLD AUTO: 0.04 K/UL — SIGNIFICANT CHANGE UP (ref 0–0.2)
BASOPHILS # BLD AUTO: 0.06 K/UL — SIGNIFICANT CHANGE UP (ref 0–0.2)
BASOPHILS NFR BLD AUTO: 0.2 % — SIGNIFICANT CHANGE UP (ref 0–2)
BASOPHILS NFR BLD AUTO: 0.3 % — SIGNIFICANT CHANGE UP (ref 0–2)
BASOPHILS NFR SPEC: 0 % — SIGNIFICANT CHANGE UP (ref 0–2)
BASOPHILS NFR SPEC: 0 % — SIGNIFICANT CHANGE UP (ref 0–2)
BENZODIAZ SERPL-MCNC: NEGATIVE — SIGNIFICANT CHANGE UP
BILIRUB DIRECT SERPL-MCNC: 0.4 MG/DL — HIGH (ref 0.1–0.2)
BILIRUB DIRECT SERPL-MCNC: 1.2 MG/DL — HIGH (ref 0.1–0.2)
BILIRUB DIRECT SERPL-MCNC: 4.7 MG/DL — HIGH (ref 0.1–0.2)
BILIRUB SERPL-MCNC: 1.4 MG/DL — HIGH (ref 0.2–1.2)
BILIRUB SERPL-MCNC: 11.1 MG/DL — HIGH (ref 0.2–1.2)
BILIRUB SERPL-MCNC: 12.8 MG/DL — HIGH (ref 0.2–1.2)
BILIRUB SERPL-MCNC: 13.7 MG/DL — HIGH (ref 0.2–1.2)
BILIRUB SERPL-MCNC: 13.8 MG/DL — HIGH (ref 0.2–1.2)
BILIRUB SERPL-MCNC: 14 MG/DL — HIGH (ref 0.2–1.2)
BILIRUB SERPL-MCNC: 17.5 MG/DL — HIGH (ref 0.2–1.2)
BILIRUB SERPL-MCNC: 18 MG/DL — HIGH (ref 0.2–1.2)
BILIRUB SERPL-MCNC: 2.8 MG/DL — HIGH (ref 0.2–1.2)
BILIRUB SERPL-MCNC: 4 MG/DL — HIGH (ref 0.2–1.2)
BILIRUB SERPL-MCNC: 4.1 MG/DL — HIGH (ref 0.2–1.2)
BILIRUB SERPL-MCNC: 6.8 MG/DL — HIGH (ref 0.2–1.2)
BILIRUB UR-MCNC: HIGH
BLASTS # FLD: 0 % — SIGNIFICANT CHANGE UP (ref 0–0)
BLASTS # FLD: 0 % — SIGNIFICANT CHANGE UP (ref 0–0)
BLOOD GAS VENOUS - CREATININE: 1.83 MG/DL — HIGH (ref 0.5–1.3)
BLOOD UR QL VISUAL: HIGH
BUN SERPL-MCNC: 34 MG/DL — HIGH (ref 7–23)
BUN SERPL-MCNC: 51 MG/DL — HIGH (ref 7–23)
BUN SERPL-MCNC: 57 MG/DL — HIGH (ref 7–23)
BUN SERPL-MCNC: 58 MG/DL — HIGH (ref 7–23)
BUN SERPL-MCNC: 59 MG/DL — HIGH (ref 7–23)
BUN SERPL-MCNC: 62 MG/DL — HIGH (ref 7–23)
BUN SERPL-MCNC: 68 MG/DL — HIGH (ref 7–23)
BUN SERPL-MCNC: 68 MG/DL — HIGH (ref 7–23)
BUN SERPL-MCNC: 71 MG/DL — HIGH (ref 7–23)
BUN SERPL-MCNC: 73 MG/DL — HIGH (ref 7–23)
BUN SERPL-MCNC: 73 MG/DL — HIGH (ref 7–23)
BUN SERPL-MCNC: 76 MG/DL — HIGH (ref 7–23)
BUN SERPL-MCNC: 79 MG/DL — HIGH (ref 7–23)
BUN SERPL-MCNC: 82 MG/DL — HIGH (ref 7–23)
BUN SERPL-MCNC: 85 MG/DL — HIGH (ref 7–23)
BUN SERPL-MCNC: 92 MG/DL — HIGH (ref 7–23)
BUN SERPL-MCNC: 92 MG/DL — HIGH (ref 7–23)
BUN SERPL-MCNC: 96 MG/DL — HIGH (ref 7–23)
BURR CELLS BLD QL SMEAR: PRESENT — SIGNIFICANT CHANGE UP
BURR CELLS BLD QL SMEAR: PRESENT — SIGNIFICANT CHANGE UP
CALCIUM SERPL-MCNC: 8.4 MG/DL — SIGNIFICANT CHANGE UP (ref 8.4–10.5)
CALCIUM SERPL-MCNC: 9.1 MG/DL — SIGNIFICANT CHANGE UP (ref 8.4–10.5)
CALCIUM SERPL-MCNC: 9.2 MG/DL — SIGNIFICANT CHANGE UP (ref 8.4–10.5)
CALCIUM SERPL-MCNC: 9.2 MG/DL — SIGNIFICANT CHANGE UP (ref 8.4–10.5)
CALCIUM SERPL-MCNC: 9.3 MG/DL — SIGNIFICANT CHANGE UP (ref 8.4–10.5)
CALCIUM SERPL-MCNC: 9.4 MG/DL — SIGNIFICANT CHANGE UP (ref 8.4–10.5)
CALCIUM SERPL-MCNC: 9.5 MG/DL — SIGNIFICANT CHANGE UP (ref 8.4–10.5)
CALCIUM SERPL-MCNC: 9.6 MG/DL — SIGNIFICANT CHANGE UP (ref 8.4–10.5)
CHLORIDE BLDA-SCNC: 125 MMOL/L — HIGH (ref 96–108)
CHLORIDE BLDA-SCNC: > 120 MMOL/L — HIGH (ref 96–108)
CHLORIDE BLDV-SCNC: 115 MMOL/L — HIGH (ref 96–108)
CHLORIDE SERPL-SCNC: 104 MMOL/L — SIGNIFICANT CHANGE UP (ref 98–107)
CHLORIDE SERPL-SCNC: 105 MMOL/L — SIGNIFICANT CHANGE UP (ref 98–107)
CHLORIDE SERPL-SCNC: 109 MMOL/L — HIGH (ref 98–107)
CHLORIDE SERPL-SCNC: 111 MMOL/L — HIGH (ref 98–107)
CHLORIDE SERPL-SCNC: 111 MMOL/L — HIGH (ref 98–107)
CHLORIDE SERPL-SCNC: 112 MMOL/L — HIGH (ref 98–107)
CHLORIDE SERPL-SCNC: 114 MMOL/L — HIGH (ref 98–107)
CHLORIDE SERPL-SCNC: 115 MMOL/L — HIGH (ref 98–107)
CHLORIDE SERPL-SCNC: 116 MMOL/L — HIGH (ref 98–107)
CHLORIDE SERPL-SCNC: 117 MMOL/L — HIGH (ref 98–107)
CHLORIDE SERPL-SCNC: 118 MMOL/L — HIGH (ref 98–107)
CHLORIDE SERPL-SCNC: 119 MMOL/L — HIGH (ref 98–107)
CHLORIDE SERPL-SCNC: 120 MMOL/L — HIGH (ref 98–107)
CHLORIDE SERPL-SCNC: 121 MMOL/L — HIGH (ref 98–107)
CHLORIDE SERPL-SCNC: 121 MMOL/L — HIGH (ref 98–107)
CHLORIDE SERPL-SCNC: 126 MMOL/L — HIGH (ref 98–107)
CHLORIDE UR-SCNC: 18 MMOL/L — SIGNIFICANT CHANGE UP
CHOLEST SERPL-MCNC: 150 MG/DL — SIGNIFICANT CHANGE UP (ref 120–199)
CK MB BLD-MCNC: 3.82 NG/ML — SIGNIFICANT CHANGE UP (ref 1–6.6)
CK MB BLD-MCNC: 4.5 NG/ML — SIGNIFICANT CHANGE UP (ref 1–6.6)
CK MB BLD-MCNC: 9.31 NG/ML — HIGH (ref 1–6.6)
CK SERPL-CCNC: 112 U/L — SIGNIFICANT CHANGE UP (ref 30–200)
CK SERPL-CCNC: 136 U/L — SIGNIFICANT CHANGE UP (ref 30–200)
CK SERPL-CCNC: 145 U/L — SIGNIFICANT CHANGE UP (ref 30–200)
CK SERPL-CCNC: 160 U/L — SIGNIFICANT CHANGE UP (ref 30–200)
CK SERPL-CCNC: 166 U/L — SIGNIFICANT CHANGE UP (ref 30–200)
CK SERPL-CCNC: 71 U/L — SIGNIFICANT CHANGE UP (ref 30–200)
CMV IGM FLD-ACNC: 12.8 AU/ML — SIGNIFICANT CHANGE UP
CMV IGM SERPL QL: NEGATIVE — SIGNIFICANT CHANGE UP
CO2 SERPL-SCNC: 16 MMOL/L — LOW (ref 22–31)
CO2 SERPL-SCNC: 16 MMOL/L — LOW (ref 22–31)
CO2 SERPL-SCNC: 17 MMOL/L — LOW (ref 22–31)
CO2 SERPL-SCNC: 18 MMOL/L — LOW (ref 22–31)
CO2 SERPL-SCNC: 18 MMOL/L — LOW (ref 22–31)
CO2 SERPL-SCNC: 19 MMOL/L — LOW (ref 22–31)
CO2 SERPL-SCNC: 20 MMOL/L — LOW (ref 22–31)
CO2 SERPL-SCNC: 20 MMOL/L — LOW (ref 22–31)
CO2 SERPL-SCNC: 21 MMOL/L — LOW (ref 22–31)
CO2 SERPL-SCNC: 21 MMOL/L — LOW (ref 22–31)
CO2 SERPL-SCNC: 24 MMOL/L — SIGNIFICANT CHANGE UP (ref 22–31)
CO2 SERPL-SCNC: 25 MMOL/L — SIGNIFICANT CHANGE UP (ref 22–31)
CO2 SERPL-SCNC: 25 MMOL/L — SIGNIFICANT CHANGE UP (ref 22–31)
CO2 SERPL-SCNC: 26 MMOL/L — SIGNIFICANT CHANGE UP (ref 22–31)
COLOR SPEC: YELLOW — SIGNIFICANT CHANGE UP
CREAT ?TM UR-MCNC: 111.46 MG/DL — SIGNIFICANT CHANGE UP
CREAT SERPL-MCNC: 1.05 MG/DL — SIGNIFICANT CHANGE UP (ref 0.5–1.3)
CREAT SERPL-MCNC: 1.1 MG/DL — SIGNIFICANT CHANGE UP (ref 0.5–1.3)
CREAT SERPL-MCNC: 1.16 MG/DL — SIGNIFICANT CHANGE UP (ref 0.5–1.3)
CREAT SERPL-MCNC: 1.17 MG/DL — SIGNIFICANT CHANGE UP (ref 0.5–1.3)
CREAT SERPL-MCNC: 1.18 MG/DL — SIGNIFICANT CHANGE UP (ref 0.5–1.3)
CREAT SERPL-MCNC: 1.21 MG/DL — SIGNIFICANT CHANGE UP (ref 0.5–1.3)
CREAT SERPL-MCNC: 1.4 MG/DL — HIGH (ref 0.5–1.3)
CREAT SERPL-MCNC: 1.41 MG/DL — HIGH (ref 0.5–1.3)
CREAT SERPL-MCNC: 1.53 MG/DL — HIGH (ref 0.5–1.3)
CREAT SERPL-MCNC: 1.61 MG/DL — HIGH (ref 0.5–1.3)
CREAT SERPL-MCNC: 1.67 MG/DL — HIGH (ref 0.5–1.3)
CREAT SERPL-MCNC: 1.7 MG/DL — HIGH (ref 0.5–1.3)
CREAT SERPL-MCNC: 1.74 MG/DL — HIGH (ref 0.5–1.3)
CREAT SERPL-MCNC: 1.77 MG/DL — HIGH (ref 0.5–1.3)
CREAT SERPL-MCNC: 1.78 MG/DL — HIGH (ref 0.5–1.3)
CREAT SERPL-MCNC: 1.78 MG/DL — HIGH (ref 0.5–1.3)
CREAT SERPL-MCNC: 1.82 MG/DL — HIGH (ref 0.5–1.3)
CREAT SERPL-MCNC: 2.1 MG/DL — HIGH (ref 0.5–1.3)
DACRYOCYTES BLD QL SMEAR: SLIGHT — SIGNIFICANT CHANGE UP
EBV EA AB TITR SER IF: POSITIVE — SIGNIFICANT CHANGE UP
EBV EA IGG SER-ACNC: NEGATIVE — SIGNIFICANT CHANGE UP
EBV PATRN SPEC IB-IMP: SIGNIFICANT CHANGE UP
EBV VCA IGG AVIDITY SER QL IA: POSITIVE — SIGNIFICANT CHANGE UP
EBV VCA IGM TITR FLD: NEGATIVE — SIGNIFICANT CHANGE UP
EOSINOPHIL # BLD AUTO: 0 K/UL — SIGNIFICANT CHANGE UP (ref 0–0.5)
EOSINOPHIL # BLD AUTO: 0.01 K/UL — SIGNIFICANT CHANGE UP (ref 0–0.5)
EOSINOPHIL NFR BLD AUTO: 0 % — SIGNIFICANT CHANGE UP (ref 0–6)
EOSINOPHIL NFR FLD: 0 % — SIGNIFICANT CHANGE UP (ref 0–6)
EOSINOPHIL NFR FLD: 0 % — SIGNIFICANT CHANGE UP (ref 0–6)
ETHANOL BLD-MCNC: < 10 MG/DL — SIGNIFICANT CHANGE UP
GAS PNL BLDV: 145 MMOL/L — SIGNIFICANT CHANGE UP (ref 136–146)
GIANT PLATELETS BLD QL SMEAR: PRESENT — SIGNIFICANT CHANGE UP
GIANT PLATELETS BLD QL SMEAR: PRESENT — SIGNIFICANT CHANGE UP
GLUCOSE BLDA-MCNC: 313 MG/DL — HIGH (ref 70–99)
GLUCOSE BLDA-MCNC: 336 MG/DL — HIGH (ref 70–99)
GLUCOSE BLDC GLUCOMTR-MCNC: 144 MG/DL — HIGH (ref 70–99)
GLUCOSE BLDC GLUCOMTR-MCNC: 154 MG/DL — HIGH (ref 70–99)
GLUCOSE BLDC GLUCOMTR-MCNC: 154 MG/DL — HIGH (ref 70–99)
GLUCOSE BLDC GLUCOMTR-MCNC: 166 MG/DL — HIGH (ref 70–99)
GLUCOSE BLDC GLUCOMTR-MCNC: 167 MG/DL — HIGH (ref 70–99)
GLUCOSE BLDC GLUCOMTR-MCNC: 168 MG/DL — HIGH (ref 70–99)
GLUCOSE BLDC GLUCOMTR-MCNC: 169 MG/DL — HIGH (ref 70–99)
GLUCOSE BLDC GLUCOMTR-MCNC: 184 MG/DL — HIGH (ref 70–99)
GLUCOSE BLDC GLUCOMTR-MCNC: 203 MG/DL — HIGH (ref 70–99)
GLUCOSE BLDC GLUCOMTR-MCNC: 204 MG/DL — HIGH (ref 70–99)
GLUCOSE BLDC GLUCOMTR-MCNC: 208 MG/DL — HIGH (ref 70–99)
GLUCOSE BLDC GLUCOMTR-MCNC: 217 MG/DL — HIGH (ref 70–99)
GLUCOSE BLDC GLUCOMTR-MCNC: 220 MG/DL — HIGH (ref 70–99)
GLUCOSE BLDC GLUCOMTR-MCNC: 220 MG/DL — HIGH (ref 70–99)
GLUCOSE BLDC GLUCOMTR-MCNC: 223 MG/DL — HIGH (ref 70–99)
GLUCOSE BLDC GLUCOMTR-MCNC: 233 MG/DL — HIGH (ref 70–99)
GLUCOSE BLDC GLUCOMTR-MCNC: 235 MG/DL — HIGH (ref 70–99)
GLUCOSE BLDC GLUCOMTR-MCNC: 238 MG/DL — HIGH (ref 70–99)
GLUCOSE BLDC GLUCOMTR-MCNC: 262 MG/DL — HIGH (ref 70–99)
GLUCOSE BLDC GLUCOMTR-MCNC: 264 MG/DL — HIGH (ref 70–99)
GLUCOSE BLDC GLUCOMTR-MCNC: 268 MG/DL — HIGH (ref 70–99)
GLUCOSE BLDC GLUCOMTR-MCNC: 292 MG/DL — HIGH (ref 70–99)
GLUCOSE BLDC GLUCOMTR-MCNC: 293 MG/DL — HIGH (ref 70–99)
GLUCOSE BLDC GLUCOMTR-MCNC: 301 MG/DL — HIGH (ref 70–99)
GLUCOSE BLDC GLUCOMTR-MCNC: 444 MG/DL — HIGH (ref 70–99)
GLUCOSE BLDV-MCNC: 186 — HIGH (ref 70–99)
GLUCOSE SERPL-MCNC: 162 MG/DL — HIGH (ref 70–99)
GLUCOSE SERPL-MCNC: 173 MG/DL — HIGH (ref 70–99)
GLUCOSE SERPL-MCNC: 173 MG/DL — HIGH (ref 70–99)
GLUCOSE SERPL-MCNC: 185 MG/DL — HIGH (ref 70–99)
GLUCOSE SERPL-MCNC: 186 MG/DL — HIGH (ref 70–99)
GLUCOSE SERPL-MCNC: 187 MG/DL — HIGH (ref 70–99)
GLUCOSE SERPL-MCNC: 195 MG/DL — HIGH (ref 70–99)
GLUCOSE SERPL-MCNC: 235 MG/DL — HIGH (ref 70–99)
GLUCOSE SERPL-MCNC: 250 MG/DL — HIGH (ref 70–99)
GLUCOSE SERPL-MCNC: 254 MG/DL — HIGH (ref 70–99)
GLUCOSE SERPL-MCNC: 272 MG/DL — HIGH (ref 70–99)
GLUCOSE SERPL-MCNC: 273 MG/DL — HIGH (ref 70–99)
GLUCOSE SERPL-MCNC: 277 MG/DL — HIGH (ref 70–99)
GLUCOSE SERPL-MCNC: 291 MG/DL — HIGH (ref 70–99)
GLUCOSE SERPL-MCNC: 301 MG/DL — HIGH (ref 70–99)
GLUCOSE SERPL-MCNC: 316 MG/DL — HIGH (ref 70–99)
GLUCOSE SERPL-MCNC: 320 MG/DL — HIGH (ref 70–99)
GLUCOSE SERPL-MCNC: 330 MG/DL — HIGH (ref 70–99)
GLUCOSE UR-MCNC: >1000 — HIGH
HAV IGM SER-ACNC: NONREACTIVE — SIGNIFICANT CHANGE UP
HAV IGM SER-ACNC: NONREACTIVE — SIGNIFICANT CHANGE UP
HBV CORE IGM SER-ACNC: NONREACTIVE — SIGNIFICANT CHANGE UP
HBV CORE IGM SER-ACNC: NONREACTIVE — SIGNIFICANT CHANGE UP
HBV SURFACE AG SER-ACNC: NONREACTIVE — SIGNIFICANT CHANGE UP
HBV SURFACE AG SER-ACNC: NONREACTIVE — SIGNIFICANT CHANGE UP
HCO3 BLDA-SCNC: 19 MMOL/L — LOW (ref 22–26)
HCO3 BLDA-SCNC: 27 MMOL/L — HIGH (ref 22–26)
HCO3 BLDA-SCNC: 28 MMOL/L — HIGH (ref 22–26)
HCO3 BLDV-SCNC: 22 MMOL/L — SIGNIFICANT CHANGE UP (ref 20–27)
HCT VFR BLD CALC: 47.5 % — SIGNIFICANT CHANGE UP (ref 39–50)
HCT VFR BLD CALC: 47.9 % — SIGNIFICANT CHANGE UP (ref 39–50)
HCT VFR BLD CALC: 49.3 % — SIGNIFICANT CHANGE UP (ref 39–50)
HCT VFR BLD CALC: 50.6 % — HIGH (ref 39–50)
HCT VFR BLD CALC: 51.5 % — HIGH (ref 39–50)
HCT VFR BLD CALC: 51.9 % — HIGH (ref 39–50)
HCT VFR BLD CALC: 52.6 % — HIGH (ref 39–50)
HCT VFR BLD CALC: 52.6 % — HIGH (ref 39–50)
HCT VFR BLD CALC: 52.8 % — HIGH (ref 39–50)
HCT VFR BLDA CALC: 44.8 % — SIGNIFICANT CHANGE UP (ref 39–51)
HCT VFR BLDA CALC: 51.1 % — HIGH (ref 39–51)
HCT VFR BLDV CALC: 54.6 % — HIGH (ref 39–51)
HCV AB S/CO SERPL IA: 0.13 S/CO — SIGNIFICANT CHANGE UP
HCV AB S/CO SERPL IA: 0.15 S/CO — SIGNIFICANT CHANGE UP
HCV AB SERPL-IMP: SIGNIFICANT CHANGE UP
HCV AB SERPL-IMP: SIGNIFICANT CHANGE UP
HDLC SERPL-MCNC: 25 MG/DL — LOW (ref 35–55)
HEPARIN CF II AG PPP-ACNC: 0.17 — SIGNIFICANT CHANGE UP (ref 0–0.39)
HGB BLD-MCNC: 14.9 G/DL — SIGNIFICANT CHANGE UP (ref 13–17)
HGB BLD-MCNC: 15.6 G/DL — SIGNIFICANT CHANGE UP (ref 13–17)
HGB BLD-MCNC: 16.3 G/DL — SIGNIFICANT CHANGE UP (ref 13–17)
HGB BLD-MCNC: 16.4 G/DL — SIGNIFICANT CHANGE UP (ref 13–17)
HGB BLD-MCNC: 16.8 G/DL — SIGNIFICANT CHANGE UP (ref 13–17)
HGB BLD-MCNC: 16.8 G/DL — SIGNIFICANT CHANGE UP (ref 13–17)
HGB BLD-MCNC: 17.4 G/DL — HIGH (ref 13–17)
HGB BLD-MCNC: 17.7 G/DL — HIGH (ref 13–17)
HGB BLD-MCNC: 18 G/DL — HIGH (ref 13–17)
HGB BLDA-MCNC: 14.6 G/DL — SIGNIFICANT CHANGE UP (ref 13–17)
HGB BLDA-MCNC: 16.7 G/DL — SIGNIFICANT CHANGE UP (ref 13–17)
HGB BLDV-MCNC: 17.9 G/DL — HIGH (ref 13–17)
IMM GRANULOCYTES # BLD AUTO: 0.05 # — SIGNIFICANT CHANGE UP
IMM GRANULOCYTES # BLD AUTO: 0.06 # — SIGNIFICANT CHANGE UP
IMM GRANULOCYTES # BLD AUTO: 0.08 # — SIGNIFICANT CHANGE UP
IMM GRANULOCYTES # BLD AUTO: 0.15 # — SIGNIFICANT CHANGE UP
IMM GRANULOCYTES # BLD AUTO: 0.16 # — SIGNIFICANT CHANGE UP
IMM GRANULOCYTES # BLD AUTO: 0.21 # — SIGNIFICANT CHANGE UP
IMM GRANULOCYTES # BLD AUTO: 0.33 # — SIGNIFICANT CHANGE UP
IMM GRANULOCYTES NFR BLD AUTO: 0.4 % — SIGNIFICANT CHANGE UP (ref 0–1.5)
IMM GRANULOCYTES NFR BLD AUTO: 0.4 % — SIGNIFICANT CHANGE UP (ref 0–1.5)
IMM GRANULOCYTES NFR BLD AUTO: 0.5 % — SIGNIFICANT CHANGE UP (ref 0–1.5)
IMM GRANULOCYTES NFR BLD AUTO: 0.9 % — SIGNIFICANT CHANGE UP (ref 0–1.5)
IMM GRANULOCYTES NFR BLD AUTO: 0.9 % — SIGNIFICANT CHANGE UP (ref 0–1.5)
IMM GRANULOCYTES NFR BLD AUTO: 1 % — SIGNIFICANT CHANGE UP (ref 0–1.5)
IMM GRANULOCYTES NFR BLD AUTO: 1.6 % — HIGH (ref 0–1.5)
INR BLD: 1.36 — HIGH (ref 0.88–1.17)
INR BLD: 1.59 — HIGH (ref 0.88–1.17)
INR BLD: 1.68 — HIGH (ref 0.88–1.17)
INR BLD: 1.74 — HIGH (ref 0.88–1.17)
INR BLD: 1.76 — HIGH (ref 0.88–1.17)
INR BLD: 1.98 — HIGH (ref 0.88–1.17)
INR BLD: 2.27 — HIGH (ref 0.88–1.17)
INR BLD: 2.41 — HIGH (ref 0.88–1.17)
INR BLD: 2.69 — HIGH (ref 0.88–1.17)
INR BLD: 2.77 — HIGH (ref 0.88–1.17)
INR BLD: 2.8 — HIGH (ref 0.88–1.17)
KETONES UR-MCNC: NEGATIVE — SIGNIFICANT CHANGE UP
LACTATE BLDA-SCNC: 14.5 MMOL/L — CRITICAL HIGH (ref 0.5–2)
LACTATE BLDA-SCNC: 5.6 MMOL/L — CRITICAL HIGH (ref 0.5–2)
LACTATE BLDV-MCNC: 4.8 MMOL/L — CRITICAL HIGH (ref 0.5–2)
LACTATE SERPL-SCNC: 2.3 MMOL/L — HIGH (ref 0.5–2)
LEUKOCYTE ESTERASE UR-ACNC: NEGATIVE — SIGNIFICANT CHANGE UP
LIPID PNL WITH DIRECT LDL SERPL: 111 MG/DL — SIGNIFICANT CHANGE UP
LYMPHOCYTES # BLD AUTO: 0.84 K/UL — LOW (ref 1–3.3)
LYMPHOCYTES # BLD AUTO: 0.85 K/UL — LOW (ref 1–3.3)
LYMPHOCYTES # BLD AUTO: 1.03 K/UL — SIGNIFICANT CHANGE UP (ref 1–3.3)
LYMPHOCYTES # BLD AUTO: 1.08 K/UL — SIGNIFICANT CHANGE UP (ref 1–3.3)
LYMPHOCYTES # BLD AUTO: 1.2 K/UL — SIGNIFICANT CHANGE UP (ref 1–3.3)
LYMPHOCYTES # BLD AUTO: 1.4 K/UL — SIGNIFICANT CHANGE UP (ref 1–3.3)
LYMPHOCYTES # BLD AUTO: 11.1 % — LOW (ref 13–44)
LYMPHOCYTES # BLD AUTO: 2.35 K/UL — SIGNIFICANT CHANGE UP (ref 1–3.3)
LYMPHOCYTES # BLD AUTO: 5 % — LOW (ref 13–44)
LYMPHOCYTES # BLD AUTO: 5.3 % — LOW (ref 13–44)
LYMPHOCYTES # BLD AUTO: 5.5 % — LOW (ref 13–44)
LYMPHOCYTES # BLD AUTO: 6.2 % — LOW (ref 13–44)
LYMPHOCYTES # BLD AUTO: 8.8 % — LOW (ref 13–44)
LYMPHOCYTES # BLD AUTO: 9.7 % — LOW (ref 13–44)
LYMPHOCYTES NFR SPEC AUTO: 1.8 % — LOW (ref 13–44)
LYMPHOCYTES NFR SPEC AUTO: 1.8 % — LOW (ref 13–44)
MACROCYTES BLD QL: SLIGHT — SIGNIFICANT CHANGE UP
MAGNESIUM SERPL-MCNC: 2.2 MG/DL — SIGNIFICANT CHANGE UP (ref 1.6–2.6)
MAGNESIUM SERPL-MCNC: 2.2 MG/DL — SIGNIFICANT CHANGE UP (ref 1.6–2.6)
MAGNESIUM SERPL-MCNC: 2.4 MG/DL — SIGNIFICANT CHANGE UP (ref 1.6–2.6)
MAGNESIUM SERPL-MCNC: 2.4 MG/DL — SIGNIFICANT CHANGE UP (ref 1.6–2.6)
MAGNESIUM SERPL-MCNC: 2.5 MG/DL — SIGNIFICANT CHANGE UP (ref 1.6–2.6)
MAGNESIUM SERPL-MCNC: 2.5 MG/DL — SIGNIFICANT CHANGE UP (ref 1.6–2.6)
MAGNESIUM SERPL-MCNC: 2.6 MG/DL — SIGNIFICANT CHANGE UP (ref 1.6–2.6)
MAGNESIUM SERPL-MCNC: 2.6 MG/DL — SIGNIFICANT CHANGE UP (ref 1.6–2.6)
MAGNESIUM SERPL-MCNC: 2.7 MG/DL — HIGH (ref 1.6–2.6)
MAGNESIUM SERPL-MCNC: 2.7 MG/DL — HIGH (ref 1.6–2.6)
MCHC RBC-ENTMCNC: 29.5 PG — SIGNIFICANT CHANGE UP (ref 27–34)
MCHC RBC-ENTMCNC: 29.6 PG — SIGNIFICANT CHANGE UP (ref 27–34)
MCHC RBC-ENTMCNC: 29.6 PG — SIGNIFICANT CHANGE UP (ref 27–34)
MCHC RBC-ENTMCNC: 29.7 PG — SIGNIFICANT CHANGE UP (ref 27–34)
MCHC RBC-ENTMCNC: 30 PG — SIGNIFICANT CHANGE UP (ref 27–34)
MCHC RBC-ENTMCNC: 30 PG — SIGNIFICANT CHANGE UP (ref 27–34)
MCHC RBC-ENTMCNC: 30.1 PG — SIGNIFICANT CHANGE UP (ref 27–34)
MCHC RBC-ENTMCNC: 30.2 PG — SIGNIFICANT CHANGE UP (ref 27–34)
MCHC RBC-ENTMCNC: 30.7 PG — SIGNIFICANT CHANGE UP (ref 27–34)
MCHC RBC-ENTMCNC: 30.8 PG — SIGNIFICANT CHANGE UP (ref 27–34)
MCHC RBC-ENTMCNC: 31.1 % — LOW (ref 32–36)
MCHC RBC-ENTMCNC: 31.1 PG — SIGNIFICANT CHANGE UP (ref 27–34)
MCHC RBC-ENTMCNC: 31.9 % — LOW (ref 32–36)
MCHC RBC-ENTMCNC: 32.2 % — SIGNIFICANT CHANGE UP (ref 32–36)
MCHC RBC-ENTMCNC: 32.6 % — SIGNIFICANT CHANGE UP (ref 32–36)
MCHC RBC-ENTMCNC: 32.8 % — SIGNIFICANT CHANGE UP (ref 32–36)
MCHC RBC-ENTMCNC: 33 % — SIGNIFICANT CHANGE UP (ref 32–36)
MCHC RBC-ENTMCNC: 33.3 % — SIGNIFICANT CHANGE UP (ref 32–36)
MCHC RBC-ENTMCNC: 34.1 % — SIGNIFICANT CHANGE UP (ref 32–36)
MCHC RBC-ENTMCNC: 34.2 % — SIGNIFICANT CHANGE UP (ref 32–36)
MCV RBC AUTO: 89.6 FL — SIGNIFICANT CHANGE UP (ref 80–100)
MCV RBC AUTO: 90.4 FL — SIGNIFICANT CHANGE UP (ref 80–100)
MCV RBC AUTO: 91 FL — SIGNIFICANT CHANGE UP (ref 80–100)
MCV RBC AUTO: 91.9 FL — SIGNIFICANT CHANGE UP (ref 80–100)
MCV RBC AUTO: 92.1 FL — SIGNIFICANT CHANGE UP (ref 80–100)
MCV RBC AUTO: 92.1 FL — SIGNIFICANT CHANGE UP (ref 80–100)
MCV RBC AUTO: 92.3 FL — SIGNIFICANT CHANGE UP (ref 80–100)
MCV RBC AUTO: 92.6 FL — SIGNIFICANT CHANGE UP (ref 80–100)
MCV RBC AUTO: 93.2 FL — SIGNIFICANT CHANGE UP (ref 80–100)
MCV RBC AUTO: 93.2 FL — SIGNIFICANT CHANGE UP (ref 80–100)
MCV RBC AUTO: 95 FL — SIGNIFICANT CHANGE UP (ref 80–100)
METAMYELOCYTES # FLD: 0 % — SIGNIFICANT CHANGE UP (ref 0–1)
METAMYELOCYTES # FLD: 0 % — SIGNIFICANT CHANGE UP (ref 0–1)
MICROCYTES BLD QL: SLIGHT — SIGNIFICANT CHANGE UP
MITOCHONDRIA AB SER-ACNC: SIGNIFICANT CHANGE UP
MONOCYTES # BLD AUTO: 0.7 K/UL — SIGNIFICANT CHANGE UP (ref 0–0.9)
MONOCYTES # BLD AUTO: 0.94 K/UL — HIGH (ref 0–0.9)
MONOCYTES # BLD AUTO: 0.95 K/UL — HIGH (ref 0–0.9)
MONOCYTES # BLD AUTO: 1.37 K/UL — HIGH (ref 0–0.9)
MONOCYTES # BLD AUTO: 1.4 K/UL — HIGH (ref 0–0.9)
MONOCYTES # BLD AUTO: 1.45 K/UL — HIGH (ref 0–0.9)
MONOCYTES # BLD AUTO: 1.74 K/UL — HIGH (ref 0–0.9)
MONOCYTES NFR BLD AUTO: 3.3 % — SIGNIFICANT CHANGE UP (ref 2–14)
MONOCYTES NFR BLD AUTO: 6.2 % — SIGNIFICANT CHANGE UP (ref 2–14)
MONOCYTES NFR BLD AUTO: 7.6 % — SIGNIFICANT CHANGE UP (ref 2–14)
MONOCYTES NFR BLD AUTO: 7.8 % — SIGNIFICANT CHANGE UP (ref 2–14)
MONOCYTES NFR BLD AUTO: 8.2 % — SIGNIFICANT CHANGE UP (ref 2–14)
MONOCYTES NFR BLD AUTO: 8.2 % — SIGNIFICANT CHANGE UP (ref 2–14)
MONOCYTES NFR BLD AUTO: 9.7 % — SIGNIFICANT CHANGE UP (ref 2–14)
MONOCYTES NFR BLD: 1.8 % — LOW (ref 2–9)
MONOCYTES NFR BLD: 5.3 % — SIGNIFICANT CHANGE UP (ref 2–9)
MYELOCYTES NFR BLD: 0 % — SIGNIFICANT CHANGE UP (ref 0–0)
MYELOCYTES NFR BLD: 0 % — SIGNIFICANT CHANGE UP (ref 0–0)
NEUTROPHIL AB SER-ACNC: 86.6 % — HIGH (ref 43–77)
NEUTROPHIL AB SER-ACNC: 91 % — HIGH (ref 43–77)
NEUTROPHILS # BLD AUTO: 10.22 K/UL — HIGH (ref 1.8–7.4)
NEUTROPHILS # BLD AUTO: 11.47 K/UL — HIGH (ref 1.8–7.4)
NEUTROPHILS # BLD AUTO: 13.48 K/UL — HIGH (ref 1.8–7.4)
NEUTROPHILS # BLD AUTO: 14.09 K/UL — HIGH (ref 1.8–7.4)
NEUTROPHILS # BLD AUTO: 14.32 K/UL — HIGH (ref 1.8–7.4)
NEUTROPHILS # BLD AUTO: 17.68 K/UL — HIGH (ref 1.8–7.4)
NEUTROPHILS # BLD AUTO: 19.24 K/UL — HIGH (ref 1.8–7.4)
NEUTROPHILS NFR BLD AUTO: 80 % — HIGH (ref 43–77)
NEUTROPHILS NFR BLD AUTO: 83 % — HIGH (ref 43–77)
NEUTROPHILS NFR BLD AUTO: 83.7 % — HIGH (ref 43–77)
NEUTROPHILS NFR BLD AUTO: 84.5 % — HIGH (ref 43–77)
NEUTROPHILS NFR BLD AUTO: 85.2 % — HIGH (ref 43–77)
NEUTROPHILS NFR BLD AUTO: 85.8 % — HIGH (ref 43–77)
NEUTROPHILS NFR BLD AUTO: 87.6 % — HIGH (ref 43–77)
NEUTS BAND # BLD: 3.6 % — SIGNIFICANT CHANGE UP (ref 0–6)
NEUTS BAND # BLD: 4.5 % — SIGNIFICANT CHANGE UP (ref 0–6)
NITRITE UR-MCNC: NEGATIVE — SIGNIFICANT CHANGE UP
NRBC # FLD: 0 — SIGNIFICANT CHANGE UP
NRBC # FLD: 0.05 — SIGNIFICANT CHANGE UP
NRBC # FLD: 0.05 — SIGNIFICANT CHANGE UP
NRBC # FLD: 0.08 — SIGNIFICANT CHANGE UP
NRBC # FLD: 0.09 — SIGNIFICANT CHANGE UP
NRBC # FLD: 0.09 — SIGNIFICANT CHANGE UP
NRBC # FLD: 0.12 — SIGNIFICANT CHANGE UP
NRBC # FLD: 0.13 — SIGNIFICANT CHANGE UP
NRBC # FLD: 0.16 — SIGNIFICANT CHANGE UP
NRBC # FLD: 0.24 — SIGNIFICANT CHANGE UP
NRBC # FLD: 0.49 — SIGNIFICANT CHANGE UP
NRBC FLD-RTO: 1.1 — SIGNIFICANT CHANGE UP
NRBC FLD-RTO: 2.3 — SIGNIFICANT CHANGE UP
NT-PROBNP SERPL-SCNC: SIGNIFICANT CHANGE UP PG/ML
OSMOLALITY UR: 648 MOSMO/KG — SIGNIFICANT CHANGE UP (ref 50–1200)
OTHER - HEMATOLOGY %: 0 — SIGNIFICANT CHANGE UP
OTHER - HEMATOLOGY %: 0 — SIGNIFICANT CHANGE UP
PCO2 BLDA: 37 MMHG — SIGNIFICANT CHANGE UP (ref 35–48)
PCO2 BLDA: 38 MMHG — SIGNIFICANT CHANGE UP (ref 35–48)
PCO2 BLDA: 42 MMHG — SIGNIFICANT CHANGE UP (ref 35–48)
PCO2 BLDV: 36 MMHG — LOW (ref 41–51)
PH BLDA: 7.32 PH — LOW (ref 7.35–7.45)
PH BLDA: 7.42 PH — SIGNIFICANT CHANGE UP (ref 7.35–7.45)
PH BLDA: 7.47 PH — HIGH (ref 7.35–7.45)
PH BLDV: 7.4 PH — SIGNIFICANT CHANGE UP (ref 7.32–7.43)
PH UR: 5.5 — SIGNIFICANT CHANGE UP (ref 4.6–8)
PHOSPHATE SERPL-MCNC: 2.3 MG/DL — LOW (ref 2.5–4.5)
PHOSPHATE SERPL-MCNC: 2.5 MG/DL — SIGNIFICANT CHANGE UP (ref 2.5–4.5)
PHOSPHATE SERPL-MCNC: 2.5 MG/DL — SIGNIFICANT CHANGE UP (ref 2.5–4.5)
PHOSPHATE SERPL-MCNC: 2.8 MG/DL — SIGNIFICANT CHANGE UP (ref 2.5–4.5)
PHOSPHATE SERPL-MCNC: 3.4 MG/DL — SIGNIFICANT CHANGE UP (ref 2.5–4.5)
PHOSPHATE SERPL-MCNC: 3.7 MG/DL — SIGNIFICANT CHANGE UP (ref 2.5–4.5)
PHOSPHATE SERPL-MCNC: 3.8 MG/DL — SIGNIFICANT CHANGE UP (ref 2.5–4.5)
PHOSPHATE SERPL-MCNC: 9.9 MG/DL — HIGH (ref 2.5–4.5)
PLATELET # BLD AUTO: 109 K/UL — LOW (ref 150–400)
PLATELET # BLD AUTO: 126 K/UL — LOW (ref 150–400)
PLATELET # BLD AUTO: 129 K/UL — LOW (ref 150–400)
PLATELET # BLD AUTO: 196 K/UL — SIGNIFICANT CHANGE UP (ref 150–400)
PLATELET # BLD AUTO: 196 K/UL — SIGNIFICANT CHANGE UP (ref 150–400)
PLATELET # BLD AUTO: 222 K/UL — SIGNIFICANT CHANGE UP (ref 150–400)
PLATELET # BLD AUTO: 32 K/UL — LOW (ref 150–400)
PLATELET # BLD AUTO: 47 K/UL — LOW (ref 150–400)
PLATELET # BLD AUTO: 50 K/UL — LOW (ref 150–400)
PLATELET # BLD AUTO: 50 K/UL — LOW (ref 150–400)
PLATELET # BLD AUTO: 52 K/UL — LOW (ref 150–400)
PLATELET COUNT - ESTIMATE: SIGNIFICANT CHANGE UP
PLATELET COUNT - ESTIMATE: SIGNIFICANT CHANGE UP
PMV BLD: 10.9 FL — SIGNIFICANT CHANGE UP (ref 7–13)
PMV BLD: 11.1 FL — SIGNIFICANT CHANGE UP (ref 7–13)
PMV BLD: 11.1 FL — SIGNIFICANT CHANGE UP (ref 7–13)
PMV BLD: 11.4 FL — SIGNIFICANT CHANGE UP (ref 7–13)
PMV BLD: 11.8 FL — SIGNIFICANT CHANGE UP (ref 7–13)
PMV BLD: 12.3 FL — SIGNIFICANT CHANGE UP (ref 7–13)
PMV BLD: SIGNIFICANT CHANGE UP FL (ref 7–13)
PO2 BLDA: 189 MMHG — HIGH (ref 83–108)
PO2 BLDA: 233 MMHG — HIGH (ref 83–108)
PO2 BLDA: 78 MMHG — LOW (ref 83–108)
PO2 BLDV: 35 MMHG — SIGNIFICANT CHANGE UP (ref 35–40)
POIKILOCYTOSIS BLD QL AUTO: SLIGHT — SIGNIFICANT CHANGE UP
POIKILOCYTOSIS BLD QL AUTO: SLIGHT — SIGNIFICANT CHANGE UP
POLYCHROMASIA BLD QL SMEAR: SLIGHT — SIGNIFICANT CHANGE UP
POLYCHROMASIA BLD QL SMEAR: SLIGHT — SIGNIFICANT CHANGE UP
POTASSIUM BLDA-SCNC: 3.9 MMOL/L — SIGNIFICANT CHANGE UP (ref 3.4–4.5)
POTASSIUM BLDA-SCNC: 4 MMOL/L — SIGNIFICANT CHANGE UP (ref 3.4–4.5)
POTASSIUM BLDV-SCNC: 4.1 MMOL/L — SIGNIFICANT CHANGE UP (ref 3.4–4.5)
POTASSIUM SERPL-MCNC: 3.6 MMOL/L — SIGNIFICANT CHANGE UP (ref 3.5–5.3)
POTASSIUM SERPL-MCNC: 3.8 MMOL/L — SIGNIFICANT CHANGE UP (ref 3.5–5.3)
POTASSIUM SERPL-MCNC: 3.9 MMOL/L — SIGNIFICANT CHANGE UP (ref 3.5–5.3)
POTASSIUM SERPL-MCNC: 4 MMOL/L — SIGNIFICANT CHANGE UP (ref 3.5–5.3)
POTASSIUM SERPL-MCNC: 4 MMOL/L — SIGNIFICANT CHANGE UP (ref 3.5–5.3)
POTASSIUM SERPL-MCNC: 4.1 MMOL/L — SIGNIFICANT CHANGE UP (ref 3.5–5.3)
POTASSIUM SERPL-MCNC: 4.1 MMOL/L — SIGNIFICANT CHANGE UP (ref 3.5–5.3)
POTASSIUM SERPL-MCNC: 4.2 MMOL/L — SIGNIFICANT CHANGE UP (ref 3.5–5.3)
POTASSIUM SERPL-MCNC: 4.2 MMOL/L — SIGNIFICANT CHANGE UP (ref 3.5–5.3)
POTASSIUM SERPL-MCNC: 4.3 MMOL/L — SIGNIFICANT CHANGE UP (ref 3.5–5.3)
POTASSIUM SERPL-MCNC: 4.4 MMOL/L — SIGNIFICANT CHANGE UP (ref 3.5–5.3)
POTASSIUM SERPL-MCNC: 4.6 MMOL/L — SIGNIFICANT CHANGE UP (ref 3.5–5.3)
POTASSIUM SERPL-MCNC: 4.6 MMOL/L — SIGNIFICANT CHANGE UP (ref 3.5–5.3)
POTASSIUM SERPL-MCNC: 4.7 MMOL/L — SIGNIFICANT CHANGE UP (ref 3.5–5.3)
POTASSIUM SERPL-MCNC: 4.9 MMOL/L — SIGNIFICANT CHANGE UP (ref 3.5–5.3)
POTASSIUM SERPL-SCNC: 3.6 MMOL/L — SIGNIFICANT CHANGE UP (ref 3.5–5.3)
POTASSIUM SERPL-SCNC: 3.8 MMOL/L — SIGNIFICANT CHANGE UP (ref 3.5–5.3)
POTASSIUM SERPL-SCNC: 3.9 MMOL/L — SIGNIFICANT CHANGE UP (ref 3.5–5.3)
POTASSIUM SERPL-SCNC: 4 MMOL/L — SIGNIFICANT CHANGE UP (ref 3.5–5.3)
POTASSIUM SERPL-SCNC: 4 MMOL/L — SIGNIFICANT CHANGE UP (ref 3.5–5.3)
POTASSIUM SERPL-SCNC: 4.1 MMOL/L — SIGNIFICANT CHANGE UP (ref 3.5–5.3)
POTASSIUM SERPL-SCNC: 4.1 MMOL/L — SIGNIFICANT CHANGE UP (ref 3.5–5.3)
POTASSIUM SERPL-SCNC: 4.2 MMOL/L — SIGNIFICANT CHANGE UP (ref 3.5–5.3)
POTASSIUM SERPL-SCNC: 4.2 MMOL/L — SIGNIFICANT CHANGE UP (ref 3.5–5.3)
POTASSIUM SERPL-SCNC: 4.3 MMOL/L — SIGNIFICANT CHANGE UP (ref 3.5–5.3)
POTASSIUM SERPL-SCNC: 4.4 MMOL/L — SIGNIFICANT CHANGE UP (ref 3.5–5.3)
POTASSIUM SERPL-SCNC: 4.6 MMOL/L — SIGNIFICANT CHANGE UP (ref 3.5–5.3)
POTASSIUM SERPL-SCNC: 4.6 MMOL/L — SIGNIFICANT CHANGE UP (ref 3.5–5.3)
POTASSIUM SERPL-SCNC: 4.7 MMOL/L — SIGNIFICANT CHANGE UP (ref 3.5–5.3)
POTASSIUM SERPL-SCNC: 4.9 MMOL/L — SIGNIFICANT CHANGE UP (ref 3.5–5.3)
POTASSIUM UR-SCNC: 48.1 MMOL/L — SIGNIFICANT CHANGE UP
PREALB SERPL-MCNC: 10 MG/DL — LOW (ref 20–40)
PROMYELOCYTES # FLD: 0 % — SIGNIFICANT CHANGE UP (ref 0–0)
PROMYELOCYTES # FLD: 0 % — SIGNIFICANT CHANGE UP (ref 0–0)
PROT SERPL-MCNC: 4.7 G/DL — LOW (ref 6–8.3)
PROT SERPL-MCNC: 5.3 G/DL — LOW (ref 6–8.3)
PROT SERPL-MCNC: 5.5 G/DL — LOW (ref 6–8.3)
PROT SERPL-MCNC: 5.8 G/DL — LOW (ref 6–8.3)
PROT SERPL-MCNC: 5.8 G/DL — LOW (ref 6–8.3)
PROT SERPL-MCNC: 5.9 G/DL — LOW (ref 6–8.3)
PROT SERPL-MCNC: 5.9 G/DL — LOW (ref 6–8.3)
PROT SERPL-MCNC: 6 G/DL — SIGNIFICANT CHANGE UP (ref 6–8.3)
PROT SERPL-MCNC: 6.5 G/DL — SIGNIFICANT CHANGE UP (ref 6–8.3)
PROT SERPL-MCNC: 6.5 G/DL — SIGNIFICANT CHANGE UP (ref 6–8.3)
PROT SERPL-MCNC: 6.7 G/DL — SIGNIFICANT CHANGE UP (ref 6–8.3)
PROT SERPL-MCNC: 6.9 G/DL — SIGNIFICANT CHANGE UP (ref 6–8.3)
PROT UR-MCNC: 100 MG/DL — SIGNIFICANT CHANGE UP
PROTHROM AB SERPL-ACNC: 15.7 SEC — HIGH (ref 9.8–13.1)
PROTHROM AB SERPL-ACNC: 18.4 SEC — HIGH (ref 9.8–13.1)
PROTHROM AB SERPL-ACNC: 19.5 SEC — HIGH (ref 9.8–13.1)
PROTHROM AB SERPL-ACNC: 19.5 SEC — HIGH (ref 9.8–13.1)
PROTHROM AB SERPL-ACNC: 20.5 SEC — HIGH (ref 9.8–13.1)
PROTHROM AB SERPL-ACNC: 22.3 SEC — HIGH (ref 9.8–13.1)
PROTHROM AB SERPL-ACNC: 26.5 SEC — HIGH (ref 9.8–13.1)
PROTHROM AB SERPL-ACNC: 28.2 SEC — HIGH (ref 9.8–13.1)
PROTHROM AB SERPL-ACNC: 31.4 SEC — HIGH (ref 9.8–13.1)
PROTHROM AB SERPL-ACNC: 31.6 SEC — HIGH (ref 9.8–13.1)
PROTHROM AB SERPL-ACNC: 31.7 SEC — HIGH (ref 9.8–13.1)
RBC # BLD: 5.04 M/UL — SIGNIFICANT CHANGE UP (ref 4.2–5.8)
RBC # BLD: 5.17 M/UL — SIGNIFICANT CHANGE UP (ref 4.2–5.8)
RBC # BLD: 5.35 M/UL — SIGNIFICANT CHANGE UP (ref 4.2–5.8)
RBC # BLD: 5.43 M/UL — SIGNIFICANT CHANGE UP (ref 4.2–5.8)
RBC # BLD: 5.43 M/UL — SIGNIFICANT CHANGE UP (ref 4.2–5.8)
RBC # BLD: 5.48 M/UL — SIGNIFICANT CHANGE UP (ref 4.2–5.8)
RBC # BLD: 5.59 M/UL — SIGNIFICANT CHANGE UP (ref 4.2–5.8)
RBC # BLD: 5.68 M/UL — SIGNIFICANT CHANGE UP (ref 4.2–5.8)
RBC # BLD: 5.74 M/UL — SIGNIFICANT CHANGE UP (ref 4.2–5.8)
RBC # BLD: 5.78 M/UL — SIGNIFICANT CHANGE UP (ref 4.2–5.8)
RBC # BLD: 5.89 M/UL — HIGH (ref 4.2–5.8)
RBC # FLD: 14.7 % — HIGH (ref 10.3–14.5)
RBC # FLD: 15.1 % — HIGH (ref 10.3–14.5)
RBC # FLD: 15.1 % — HIGH (ref 10.3–14.5)
RBC # FLD: 15.3 % — HIGH (ref 10.3–14.5)
RBC # FLD: 15.5 % — HIGH (ref 10.3–14.5)
RBC # FLD: 15.9 % — HIGH (ref 10.3–14.5)
RBC # FLD: 17.1 % — HIGH (ref 10.3–14.5)
RBC # FLD: 17.4 % — HIGH (ref 10.3–14.5)
RBC # FLD: 17.9 % — HIGH (ref 10.3–14.5)
RBC # FLD: 18.3 % — HIGH (ref 10.3–14.5)
RBC # FLD: 18.4 % — HIGH (ref 10.3–14.5)
RBC CASTS # UR COMP ASSIST: SIGNIFICANT CHANGE UP (ref 0–?)
SALICYLATES SERPL-MCNC: < 5 MG/DL — LOW (ref 15–30)
SAO2 % BLDA: 95.6 % — SIGNIFICANT CHANGE UP (ref 95–99)
SAO2 % BLDA: 99.4 % — HIGH (ref 95–99)
SAO2 % BLDA: 99.5 % — HIGH (ref 95–99)
SAO2 % BLDV: 57.6 % — LOW (ref 60–85)
SCHISTOCYTES BLD QL AUTO: SLIGHT — SIGNIFICANT CHANGE UP
SCHISTOCYTES BLD QL AUTO: SLIGHT — SIGNIFICANT CHANGE UP
SODIUM BLDA-SCNC: 156 MMOL/L — HIGH (ref 136–146)
SODIUM BLDA-SCNC: 159 MMOL/L — HIGH (ref 136–146)
SODIUM SERPL-SCNC: 144 MMOL/L — SIGNIFICANT CHANGE UP (ref 135–145)
SODIUM SERPL-SCNC: 146 MMOL/L — HIGH (ref 135–145)
SODIUM SERPL-SCNC: 148 MMOL/L — HIGH (ref 135–145)
SODIUM SERPL-SCNC: 149 MMOL/L — HIGH (ref 135–145)
SODIUM SERPL-SCNC: 150 MMOL/L — HIGH (ref 135–145)
SODIUM SERPL-SCNC: 152 MMOL/L — HIGH (ref 135–145)
SODIUM SERPL-SCNC: 152 MMOL/L — HIGH (ref 135–145)
SODIUM SERPL-SCNC: 154 MMOL/L — HIGH (ref 135–145)
SODIUM SERPL-SCNC: 156 MMOL/L — HIGH (ref 135–145)
SODIUM SERPL-SCNC: 156 MMOL/L — HIGH (ref 135–145)
SODIUM SERPL-SCNC: 159 MMOL/L — HIGH (ref 135–145)
SODIUM SERPL-SCNC: 160 MMOL/L — CRITICAL HIGH (ref 135–145)
SODIUM SERPL-SCNC: 162 MMOL/L — CRITICAL HIGH (ref 135–145)
SODIUM SERPL-SCNC: 163 MMOL/L — CRITICAL HIGH (ref 135–145)
SODIUM SERPL-SCNC: 166 MMOL/L — CRITICAL HIGH (ref 135–145)
SODIUM UR-SCNC: 17 MMOL/L — SIGNIFICANT CHANGE UP
SP GR SPEC: 1.02 — SIGNIFICANT CHANGE UP (ref 1–1.04)
SRA INTERP SER-IMP: SIGNIFICANT CHANGE UP
TRIGL SERPL-MCNC: 91 MG/DL — SIGNIFICANT CHANGE UP (ref 10–149)
TROPONIN T SERPL-MCNC: 0.32 NG/ML — HIGH (ref 0–0.06)
TROPONIN T SERPL-MCNC: 0.36 NG/ML — HIGH (ref 0–0.06)
TROPONIN T SERPL-MCNC: 0.42 NG/ML — HIGH (ref 0–0.06)
TROPONIN T SERPL-MCNC: 0.49 NG/ML — HIGH (ref 0–0.06)
TROPONIN T SERPL-MCNC: 0.51 NG/ML — HIGH (ref 0–0.06)
TROPONIN T SERPL-MCNC: 0.69 NG/ML — HIGH (ref 0–0.06)
TROPONIN T SERPL-MCNC: 0.7 NG/ML — HIGH (ref 0–0.06)
UROBILINOGEN FLD QL: 1 MG/DL — SIGNIFICANT CHANGE UP
VARIANT LYMPHS # BLD: 0.9 % — SIGNIFICANT CHANGE UP
VARIANT LYMPHS # BLD: 2.7 % — SIGNIFICANT CHANGE UP
WBC # BLD: 12.31 K/UL — HIGH (ref 3.8–10.5)
WBC # BLD: 14.36 K/UL — HIGH (ref 3.8–10.5)
WBC # BLD: 14.36 K/UL — HIGH (ref 3.8–10.5)
WBC # BLD: 15.39 K/UL — HIGH (ref 3.8–10.5)
WBC # BLD: 16.11 K/UL — HIGH (ref 3.8–10.5)
WBC # BLD: 16.46 K/UL — HIGH (ref 3.8–10.5)
WBC # BLD: 16.68 K/UL — HIGH (ref 3.8–10.5)
WBC # BLD: 16.8 K/UL — HIGH (ref 3.8–10.5)
WBC # BLD: 21.13 K/UL — HIGH (ref 3.8–10.5)
WBC # BLD: 22.36 K/UL — HIGH (ref 3.8–10.5)
WBC # BLD: 22.43 K/UL — HIGH (ref 3.8–10.5)
WBC # FLD AUTO: 12.31 K/UL — HIGH (ref 3.8–10.5)
WBC # FLD AUTO: 14.36 K/UL — HIGH (ref 3.8–10.5)
WBC # FLD AUTO: 14.36 K/UL — HIGH (ref 3.8–10.5)
WBC # FLD AUTO: 15.39 K/UL — HIGH (ref 3.8–10.5)
WBC # FLD AUTO: 16.11 K/UL — HIGH (ref 3.8–10.5)
WBC # FLD AUTO: 16.46 K/UL — HIGH (ref 3.8–10.5)
WBC # FLD AUTO: 16.68 K/UL — HIGH (ref 3.8–10.5)
WBC # FLD AUTO: 16.8 K/UL — HIGH (ref 3.8–10.5)
WBC # FLD AUTO: 21.13 K/UL — HIGH (ref 3.8–10.5)
WBC # FLD AUTO: 22.36 K/UL — HIGH (ref 3.8–10.5)
WBC # FLD AUTO: 22.43 K/UL — HIGH (ref 3.8–10.5)
WBC UR QL: SIGNIFICANT CHANGE UP (ref 0–?)

## 2018-01-01 PROCEDURE — 71045 X-RAY EXAM CHEST 1 VIEW: CPT | Mod: 26

## 2018-01-01 PROCEDURE — 70450 CT HEAD/BRAIN W/O DYE: CPT | Mod: 26

## 2018-01-01 PROCEDURE — 93308 TTE F-UP OR LMTD: CPT | Mod: 26

## 2018-01-01 PROCEDURE — 76700 US EXAM ABDOM COMPLETE: CPT | Mod: 26

## 2018-01-01 PROCEDURE — 99222 1ST HOSP IP/OBS MODERATE 55: CPT | Mod: GC

## 2018-01-01 PROCEDURE — 74018 RADEX ABDOMEN 1 VIEW: CPT | Mod: 26,59

## 2018-01-01 PROCEDURE — 99356: CPT | Mod: GC

## 2018-01-01 PROCEDURE — 99291 CRITICAL CARE FIRST HOUR: CPT | Mod: 25

## 2018-01-01 PROCEDURE — 93306 TTE W/DOPPLER COMPLETE: CPT | Mod: 26

## 2018-01-01 PROCEDURE — 93880 EXTRACRANIAL BILAT STUDY: CPT | Mod: 26

## 2018-01-01 PROCEDURE — 99233 SBSQ HOSP IP/OBS HIGH 50: CPT

## 2018-01-01 PROCEDURE — 70551 MRI BRAIN STEM W/O DYE: CPT | Mod: 26

## 2018-01-01 PROCEDURE — 99291 CRITICAL CARE FIRST HOUR: CPT

## 2018-01-01 PROCEDURE — 99232 SBSQ HOSP IP/OBS MODERATE 35: CPT | Mod: GC

## 2018-01-01 PROCEDURE — 76604 US EXAM CHEST: CPT | Mod: 26

## 2018-01-01 PROCEDURE — 99223 1ST HOSP IP/OBS HIGH 75: CPT

## 2018-01-01 PROCEDURE — 43752 NASAL/OROGASTRIC W/TUBE PLMT: CPT

## 2018-01-01 PROCEDURE — 31500 INSERT EMERGENCY AIRWAY: CPT

## 2018-01-01 PROCEDURE — 70544 MR ANGIOGRAPHY HEAD W/O DYE: CPT | Mod: 26,59

## 2018-01-01 PROCEDURE — 99223 1ST HOSP IP/OBS HIGH 75: CPT | Mod: GC

## 2018-01-01 RX ORDER — METOPROLOL TARTRATE 50 MG
5 TABLET ORAL ONCE
Qty: 0 | Refills: 0 | Status: COMPLETED | OUTPATIENT
Start: 2018-01-01 | End: 2018-01-01

## 2018-01-01 RX ORDER — METOPROLOL TARTRATE 50 MG
25 TABLET ORAL
Qty: 0 | Refills: 0 | Status: DISCONTINUED | OUTPATIENT
Start: 2018-01-01 | End: 2018-01-01

## 2018-01-01 RX ORDER — SODIUM CHLORIDE 9 MG/ML
3 INJECTION INTRAMUSCULAR; INTRAVENOUS; SUBCUTANEOUS
Qty: 0 | Refills: 0 | Status: DISCONTINUED | OUTPATIENT
Start: 2018-01-01 | End: 2018-01-01

## 2018-01-01 RX ORDER — SODIUM CHLORIDE 0.65 %
1 AEROSOL, SPRAY (ML) NASAL THREE TIMES A DAY
Qty: 0 | Refills: 0 | Status: DISCONTINUED | OUTPATIENT
Start: 2018-01-01 | End: 2018-01-01

## 2018-01-01 RX ORDER — NOREPINEPHRINE BITARTRATE/D5W 8 MG/250ML
0.04 PLASTIC BAG, INJECTION (ML) INTRAVENOUS
Qty: 8 | Refills: 0 | Status: DISCONTINUED | OUTPATIENT
Start: 2018-01-01 | End: 2018-01-01

## 2018-01-01 RX ORDER — SODIUM CHLORIDE 9 MG/ML
1000 INJECTION, SOLUTION INTRAVENOUS
Qty: 0 | Refills: 0 | Status: DISCONTINUED | OUTPATIENT
Start: 2018-01-01 | End: 2018-01-01

## 2018-01-01 RX ORDER — PROPOFOL 10 MG/ML
20 INJECTION, EMULSION INTRAVENOUS
Qty: 1000 | Refills: 0 | Status: DISCONTINUED | OUTPATIENT
Start: 2018-01-01 | End: 2018-01-01

## 2018-01-01 RX ORDER — CHLORHEXIDINE GLUCONATE 213 G/1000ML
15 SOLUTION TOPICAL
Qty: 0 | Refills: 0 | Status: DISCONTINUED | OUTPATIENT
Start: 2018-01-01 | End: 2018-01-01

## 2018-01-01 RX ORDER — DOBUTAMINE HCL 250MG/20ML
5 VIAL (ML) INTRAVENOUS
Qty: 500 | Refills: 0 | Status: DISCONTINUED | OUTPATIENT
Start: 2018-01-01 | End: 2018-01-01

## 2018-01-01 RX ORDER — INSULIN GLARGINE 100 [IU]/ML
5 INJECTION, SOLUTION SUBCUTANEOUS ONCE
Qty: 0 | Refills: 0 | Status: COMPLETED | OUTPATIENT
Start: 2018-01-01 | End: 2018-01-01

## 2018-01-01 RX ORDER — HALOPERIDOL DECANOATE 100 MG/ML
2.5 INJECTION INTRAMUSCULAR ONCE
Qty: 0 | Refills: 0 | Status: DISCONTINUED | OUTPATIENT
Start: 2018-01-01 | End: 2018-01-01

## 2018-01-01 RX ORDER — HEPARIN SODIUM 5000 [USP'U]/ML
5000 INJECTION INTRAVENOUS; SUBCUTANEOUS EVERY 8 HOURS
Qty: 0 | Refills: 0 | Status: DISCONTINUED | OUTPATIENT
Start: 2018-01-01 | End: 2018-01-01

## 2018-01-01 RX ORDER — METOPROLOL TARTRATE 50 MG
5 TABLET ORAL
Qty: 0 | Refills: 0 | Status: DISCONTINUED | OUTPATIENT
Start: 2018-01-01 | End: 2018-01-01

## 2018-01-01 RX ORDER — DEXTROSE 50 % IN WATER 50 %
12.5 SYRINGE (ML) INTRAVENOUS ONCE
Qty: 0 | Refills: 0 | Status: DISCONTINUED | OUTPATIENT
Start: 2018-01-01 | End: 2018-01-01

## 2018-01-01 RX ORDER — DEXTROSE 50 % IN WATER 50 %
25 SYRINGE (ML) INTRAVENOUS ONCE
Qty: 0 | Refills: 0 | Status: DISCONTINUED | OUTPATIENT
Start: 2018-01-01 | End: 2018-01-01

## 2018-01-01 RX ORDER — POTASSIUM PHOSPHATE, MONOBASIC POTASSIUM PHOSPHATE, DIBASIC 236; 224 MG/ML; MG/ML
15 INJECTION, SOLUTION INTRAVENOUS ONCE
Qty: 0 | Refills: 0 | Status: COMPLETED | OUTPATIENT
Start: 2018-01-01 | End: 2018-01-01

## 2018-01-01 RX ORDER — MORPHINE SULFATE 50 MG/1
1 CAPSULE, EXTENDED RELEASE ORAL ONCE
Qty: 0 | Refills: 0 | Status: DISCONTINUED | OUTPATIENT
Start: 2018-01-01 | End: 2018-01-01

## 2018-01-01 RX ORDER — THIAMINE MONONITRATE (VIT B1) 100 MG
100 TABLET ORAL ONCE
Qty: 0 | Refills: 0 | Status: COMPLETED | OUTPATIENT
Start: 2018-01-01 | End: 2018-01-01

## 2018-01-01 RX ORDER — LACTULOSE 10 G/15ML
20 SOLUTION ORAL THREE TIMES A DAY
Qty: 0 | Refills: 0 | Status: DISCONTINUED | OUTPATIENT
Start: 2018-01-01 | End: 2018-01-01

## 2018-01-01 RX ORDER — OLANZAPINE 15 MG/1
2.5 TABLET, FILM COATED ORAL ONCE
Qty: 0 | Refills: 0 | Status: COMPLETED | OUTPATIENT
Start: 2018-01-01 | End: 2018-01-01

## 2018-01-01 RX ORDER — SODIUM CHLORIDE 9 MG/ML
1000 INJECTION INTRAMUSCULAR; INTRAVENOUS; SUBCUTANEOUS
Qty: 0 | Refills: 0 | Status: DISCONTINUED | OUTPATIENT
Start: 2018-01-01 | End: 2018-01-01

## 2018-01-01 RX ORDER — GLUCAGON INJECTION, SOLUTION 0.5 MG/.1ML
1 INJECTION, SOLUTION SUBCUTANEOUS ONCE
Qty: 0 | Refills: 0 | Status: DISCONTINUED | OUTPATIENT
Start: 2018-01-01 | End: 2018-01-01

## 2018-01-01 RX ORDER — THIAMINE MONONITRATE (VIT B1) 100 MG
100 TABLET ORAL DAILY
Qty: 0 | Refills: 0 | Status: DISCONTINUED | OUTPATIENT
Start: 2018-01-01 | End: 2018-01-01

## 2018-01-01 RX ORDER — CHLORHEXIDINE GLUCONATE 213 G/1000ML
1 SOLUTION TOPICAL
Qty: 0 | Refills: 0 | Status: DISCONTINUED | OUTPATIENT
Start: 2018-01-01 | End: 2018-01-01

## 2018-01-01 RX ORDER — INSULIN LISPRO 100/ML
VIAL (ML) SUBCUTANEOUS EVERY 6 HOURS
Qty: 0 | Refills: 0 | Status: DISCONTINUED | OUTPATIENT
Start: 2018-01-01 | End: 2018-01-01

## 2018-01-01 RX ORDER — POTASSIUM PHOSPHATE, MONOBASIC POTASSIUM PHOSPHATE, DIBASIC 236; 224 MG/ML; MG/ML
15 INJECTION, SOLUTION INTRAVENOUS ONCE
Qty: 0 | Refills: 0 | Status: DISCONTINUED | OUTPATIENT
Start: 2018-01-01 | End: 2018-01-01

## 2018-01-01 RX ORDER — CHLORHEXIDINE GLUCONATE 213 G/1000ML
1 SOLUTION TOPICAL DAILY
Qty: 0 | Refills: 0 | Status: DISCONTINUED | OUTPATIENT
Start: 2018-01-01 | End: 2018-01-01

## 2018-01-01 RX ORDER — DEXTROSE 50 % IN WATER 50 %
1 SYRINGE (ML) INTRAVENOUS ONCE
Qty: 0 | Refills: 0 | Status: DISCONTINUED | OUTPATIENT
Start: 2018-01-01 | End: 2018-01-01

## 2018-01-01 RX ADMIN — PROPOFOL 9.84 MICROGRAM(S)/KG/MIN: 10 INJECTION, EMULSION INTRAVENOUS at 06:22

## 2018-01-01 RX ADMIN — Medication 25 MILLIGRAM(S): at 15:39

## 2018-01-01 RX ADMIN — HEPARIN SODIUM 5000 UNIT(S): 5000 INJECTION INTRAVENOUS; SUBCUTANEOUS at 05:52

## 2018-01-01 RX ADMIN — Medication 2: at 06:45

## 2018-01-01 RX ADMIN — Medication 12.3 MICROGRAM(S)/KG/MIN: at 08:39

## 2018-01-01 RX ADMIN — Medication 2: at 00:18

## 2018-01-01 RX ADMIN — Medication: at 12:03

## 2018-01-01 RX ADMIN — Medication 25 MILLIGRAM(S): at 17:04

## 2018-01-01 RX ADMIN — Medication 6.15 MICROGRAM(S)/KG/MIN: at 03:22

## 2018-01-01 RX ADMIN — Medication: at 09:50

## 2018-01-01 RX ADMIN — Medication 1: at 06:24

## 2018-01-01 RX ADMIN — CHLORHEXIDINE GLUCONATE 15 MILLILITER(S): 213 SOLUTION TOPICAL at 06:21

## 2018-01-01 RX ADMIN — Medication 5 MILLIGRAM(S): at 05:45

## 2018-01-01 RX ADMIN — SODIUM CHLORIDE 75 MILLILITER(S): 9 INJECTION, SOLUTION INTRAVENOUS at 10:57

## 2018-01-01 RX ADMIN — POTASSIUM PHOSPHATE, MONOBASIC POTASSIUM PHOSPHATE, DIBASIC 62.5 MILLIMOLE(S): 236; 224 INJECTION, SOLUTION INTRAVENOUS at 06:45

## 2018-01-01 RX ADMIN — CHLORHEXIDINE GLUCONATE 1 APPLICATION(S): 213 SOLUTION TOPICAL at 10:53

## 2018-01-01 RX ADMIN — SODIUM CHLORIDE 75 MILLILITER(S): 9 INJECTION, SOLUTION INTRAVENOUS at 12:35

## 2018-01-01 RX ADMIN — Medication 2: at 18:06

## 2018-01-01 RX ADMIN — Medication 25 MILLIGRAM(S): at 05:21

## 2018-01-01 RX ADMIN — Medication 1: at 17:51

## 2018-01-01 RX ADMIN — HEPARIN SODIUM 5000 UNIT(S): 5000 INJECTION INTRAVENOUS; SUBCUTANEOUS at 22:40

## 2018-01-01 RX ADMIN — Medication 2: at 23:46

## 2018-01-01 RX ADMIN — SODIUM CHLORIDE 50 MILLILITER(S): 9 INJECTION, SOLUTION INTRAVENOUS at 18:06

## 2018-01-01 RX ADMIN — HEPARIN SODIUM 5000 UNIT(S): 5000 INJECTION INTRAVENOUS; SUBCUTANEOUS at 07:56

## 2018-01-01 RX ADMIN — Medication 5 MILLIGRAM(S): at 18:00

## 2018-01-01 RX ADMIN — PROPOFOL 9.84 MICROGRAM(S)/KG/MIN: 10 INJECTION, EMULSION INTRAVENOUS at 03:22

## 2018-01-01 RX ADMIN — Medication 1: at 13:32

## 2018-01-01 RX ADMIN — Medication 1: at 00:02

## 2018-01-01 RX ADMIN — Medication 3: at 17:55

## 2018-01-01 RX ADMIN — Medication 2.5 MILLIGRAM(S): at 14:33

## 2018-01-01 RX ADMIN — Medication 3: at 06:53

## 2018-01-01 RX ADMIN — Medication 5 MILLIGRAM(S): at 06:01

## 2018-01-01 RX ADMIN — Medication 12.3 MICROGRAM(S)/KG/MIN: at 19:36

## 2018-01-01 RX ADMIN — PROPOFOL 9.84 MICROGRAM(S)/KG/MIN: 10 INJECTION, EMULSION INTRAVENOUS at 08:07

## 2018-01-01 RX ADMIN — Medication 2: at 12:56

## 2018-01-01 RX ADMIN — Medication: at 09:48

## 2018-01-01 RX ADMIN — Medication 6.15 MICROGRAM(S)/KG/MIN: at 08:06

## 2018-01-01 RX ADMIN — HEPARIN SODIUM 5000 UNIT(S): 5000 INJECTION INTRAVENOUS; SUBCUTANEOUS at 05:17

## 2018-01-01 RX ADMIN — Medication 2: at 17:09

## 2018-01-01 RX ADMIN — Medication 2: at 11:01

## 2018-01-01 RX ADMIN — Medication 1: at 23:25

## 2018-01-01 RX ADMIN — LACTULOSE 20 GRAM(S): 10 SOLUTION ORAL at 01:54

## 2018-01-01 RX ADMIN — LACTULOSE 20 GRAM(S): 10 SOLUTION ORAL at 14:33

## 2018-01-01 RX ADMIN — SODIUM CHLORIDE 3 MILLILITER(S): 9 INJECTION INTRAMUSCULAR; INTRAVENOUS; SUBCUTANEOUS at 17:06

## 2018-01-01 RX ADMIN — Medication 3: at 12:35

## 2018-01-01 RX ADMIN — Medication 2: at 12:05

## 2018-01-01 RX ADMIN — SODIUM CHLORIDE 50 MILLILITER(S): 9 INJECTION INTRAMUSCULAR; INTRAVENOUS; SUBCUTANEOUS at 13:03

## 2018-01-01 RX ADMIN — SODIUM CHLORIDE 75 MILLILITER(S): 9 INJECTION, SOLUTION INTRAVENOUS at 19:43

## 2018-01-01 RX ADMIN — CHLORHEXIDINE GLUCONATE 1 APPLICATION(S): 213 SOLUTION TOPICAL at 11:46

## 2018-01-01 RX ADMIN — Medication: at 13:04

## 2018-01-01 RX ADMIN — Medication 2: at 17:54

## 2018-01-01 RX ADMIN — HEPARIN SODIUM 5000 UNIT(S): 5000 INJECTION INTRAVENOUS; SUBCUTANEOUS at 14:14

## 2018-01-01 RX ADMIN — Medication 25 MILLIGRAM(S): at 05:52

## 2018-01-01 RX ADMIN — Medication 2: at 05:30

## 2018-01-01 RX ADMIN — LACTULOSE 20 GRAM(S): 10 SOLUTION ORAL at 06:21

## 2018-01-01 RX ADMIN — HEPARIN SODIUM 5000 UNIT(S): 5000 INJECTION INTRAVENOUS; SUBCUTANEOUS at 17:44

## 2018-01-01 RX ADMIN — Medication 1: at 11:46

## 2018-01-01 RX ADMIN — MORPHINE SULFATE 1 MILLIGRAM(S): 50 CAPSULE, EXTENDED RELEASE ORAL at 05:15

## 2018-01-01 RX ADMIN — HEPARIN SODIUM 5000 UNIT(S): 5000 INJECTION INTRAVENOUS; SUBCUTANEOUS at 17:04

## 2018-01-01 RX ADMIN — SODIUM CHLORIDE 60 MILLILITER(S): 9 INJECTION, SOLUTION INTRAVENOUS at 00:19

## 2018-01-01 RX ADMIN — MORPHINE SULFATE 1 MILLIGRAM(S): 50 CAPSULE, EXTENDED RELEASE ORAL at 05:30

## 2018-01-01 RX ADMIN — Medication 5 MILLIGRAM(S): at 16:08

## 2018-01-01 RX ADMIN — LACTULOSE 20 GRAM(S): 10 SOLUTION ORAL at 05:22

## 2018-01-01 RX ADMIN — INSULIN GLARGINE 5 UNIT(S): 100 INJECTION, SOLUTION SUBCUTANEOUS at 20:56

## 2018-01-01 RX ADMIN — OLANZAPINE 2.5 MILLIGRAM(S): 15 TABLET, FILM COATED ORAL at 02:05

## 2018-01-01 RX ADMIN — SODIUM CHLORIDE 60 MILLILITER(S): 9 INJECTION, SOLUTION INTRAVENOUS at 18:50

## 2018-01-01 RX ADMIN — Medication 2: at 17:48

## 2018-01-01 RX ADMIN — Medication: at 18:57

## 2018-01-01 RX ADMIN — Medication 5 MILLIGRAM(S): at 23:33

## 2018-01-01 RX ADMIN — Medication 81 MILLIGRAM(S): at 12:56

## 2018-01-01 RX ADMIN — Medication 6.15 MICROGRAM(S)/KG/MIN: at 06:22

## 2018-01-01 RX ADMIN — Medication 6: at 09:11

## 2018-01-01 RX ADMIN — Medication 1 APPLICATION(S): at 06:22

## 2018-01-01 RX ADMIN — Medication 1 SPRAY(S): at 20:13

## 2018-01-01 RX ADMIN — Medication 100 MILLIGRAM(S): at 14:59

## 2018-01-01 RX ADMIN — Medication 100 MILLIGRAM(S): at 11:01

## 2018-01-01 NOTE — CHART NOTE - NSCHARTNOTEFT_GEN_A_CORE
Called by nurse to assess patient for increased lethargy. On exam pt is with delayed verbal response and dysarthric speech. Pt follows all commands and is oriented to self, place, and time. Family at bedside says he appears more tired today. Discussed with neurology, this is likely progression of previously identified left cerebellar infarct, no need to repeat CT at this time and recommended proceeding with MRI/MRA. On return to room pt is alert and talking with family and asking for water. Will continue to monitor and obtain further imaging with MRI/A H&N.

## 2018-01-01 NOTE — PROGRESS NOTE ADULT - SUBJECTIVE AND OBJECTIVE BOX
CHIEF COMPLAINT: Patient is a 77y old  male who presents with a chief complaint of dizziness/falls (31 Dec 2017 16:50)    SUBJECTIVE / OVERNIGHT EVENTS: Patient seen and examined. No acute events overnight. Pain well controlled and patient without any complaints.     MEDICATIONS  (STANDING):  aspirin  chewable 81 milliGRAM(s) Oral daily  dextrose 5%. 1000 milliLiter(s) (50 mL/Hr) IV Continuous <Continuous>  dextrose 50% Injectable 12.5 Gram(s) IV Push once  dextrose 50% Injectable 25 Gram(s) IV Push once  dextrose 50% Injectable 25 Gram(s) IV Push once  enalapril 10 milliGRAM(s) Oral daily  heparin  Injectable 5000 Unit(s) SubCutaneous every 12 hours  influenza   Vaccine 0.5 milliLiter(s) IntraMuscular once  insulin lispro (HumaLOG) corrective regimen sliding scale   SubCutaneous three times a day before meals  insulin lispro (HumaLOG) corrective regimen sliding scale   SubCutaneous at bedtime  metoprolol     tartrate 50 milliGRAM(s) Oral two times a day  simvastatin 40 milliGRAM(s) Oral at bedtime  sodium chloride 0.9%. 1000 milliLiter(s) (50 mL/Hr) IV Continuous <Continuous>    MEDICATIONS  (PRN):  dextrose Gel 1 Dose(s) Oral once PRN Blood Glucose LESS THAN 70 milliGRAM(s)/deciliter  glucagon  Injectable 1 milliGRAM(s) IntraMuscular once PRN Glucose LESS THAN 70 milligrams/deciliter    VITALS:  T(F): 98 (18 @ 06:30), Max: 98.7 (12-17 @ 13:02)  HR: 105 (18 @ 06:30) (100 - 108)  BP: 130/92 (18 @ 06:30) (130/92 - 161/105)  RR: 18 (18 @ 06:30) (16 - 20)  SpO2: 98% (18 @ 06:30)    Weight (kg): 81.647 (14:20)      PHYSICAL EXAM:  GENERAL: NAD, well-developed  CHEST/LUNG: Clear to auscultation bilaterally; No wheeze  HEART: Regular rate and rhythm; No murmurs, rubs, or gallops  ABDOMEN: Soft, Nontender, Nondistended; Bowel sounds present  EXTREMITIES:  2+ Peripheral Pulses, No clubbing, cyanosis, or edema; 4/5 RLE and 5/5 LLE  NEUROLOGY: non-focal  SKIN: + healing abrasion to L anterior tibial area; + fissure to R heel. + flaky dry skin to BL heels    LABS:              x                    140  | 22   | 26           x     >-----------< x       ------------------------< 144                   x                    4.2  | 99   | 0.99                                         Ca 9.5   Mg x     Ph x           TPro  6.5  /  Alb  3.6      TBili  1.4  /  DBili  0.4        AST  89  /  ALT  110            AlkPhos  54      INR: 1.36<H>;    PT: 15.2 SEC<H>;    PTT: 28.6 SEC    CARDIAC MARKERS  0.32 ng/mL / 166 u/L / 9.31 ng/mL  CARDIAC MARKERS  0.41 ng/mL / 220 u/L / 14.60 ng/mL  CARDIAC MARKERS  0.31 ng/mL / 254 u/L / 17.93 ng/mL    VB-31 @ 13:21  7.34/44/27/21/35.8/-2.1    RADIOLOGY & ADDITIONAL TESTS:  Imaging Personally Reviewed:    [ ] Consultant(s) Notes Reviewed:  [ ] Care Discussed with Consultants/Other Providers: CHIEF COMPLAINT: Patient is a 77y old  male who presents with a chief complaint of dizziness/falls (31 Dec 2017 16:50)    SUBJECTIVE / OVERNIGHT EVENTS: Patient seen and examined. No acute events overnight. Pain well controlled and patient without any complaints.     MEDICATIONS  (STANDING):  aspirin  chewable 81 milliGRAM(s) Oral daily  dextrose 5%. 1000 milliLiter(s) (50 mL/Hr) IV Continuous <Continuous>  dextrose 50% Injectable 12.5 Gram(s) IV Push once  dextrose 50% Injectable 25 Gram(s) IV Push once  dextrose 50% Injectable 25 Gram(s) IV Push once  enalapril 10 milliGRAM(s) Oral daily  heparin  Injectable 5000 Unit(s) SubCutaneous every 12 hours  influenza   Vaccine 0.5 milliLiter(s) IntraMuscular once  insulin lispro (HumaLOG) corrective regimen sliding scale   SubCutaneous three times a day before meals  insulin lispro (HumaLOG) corrective regimen sliding scale   SubCutaneous at bedtime  metoprolol     tartrate 50 milliGRAM(s) Oral two times a day  simvastatin 40 milliGRAM(s) Oral at bedtime  sodium chloride 0.9%. 1000 milliLiter(s) (50 mL/Hr) IV Continuous <Continuous>    MEDICATIONS  (PRN):  dextrose Gel 1 Dose(s) Oral once PRN Blood Glucose LESS THAN 70 milliGRAM(s)/deciliter  glucagon  Injectable 1 milliGRAM(s) IntraMuscular once PRN Glucose LESS THAN 70 milligrams/deciliter    VITALS:  T(F): 98 (18 @ 06:30), Max: 98.7 (12-17 @ 13:02)  HR: 105 (18 @ 06:30) (100 - 108)  BP: 130/92 (18 @ 06:30) (130/92 - 161/105)  RR: 18 (18 @ 06:30) (16 - 20)  SpO2: 98% (18 @ 06:30)    Weight (kg): 81.647 (14:20)      PHYSICAL EXAM:  GENERAL: NAD, well-developed  CHEST/LUNG: Clear to auscultation bilaterally; No wheeze  HEART: Regular rate and rhythm; No murmurs, rubs, or gallops  ABDOMEN: Soft, Nontender, Nondistended; Bowel sounds present  EXTREMITIES:  2+ Peripheral Pulses, No clubbing, cyanosis, or edema; 4/5 RLE and 5/5 LLE  NEUROLOGY: left dysarthria and left dysmetria  SKIN: + healing abrasion to L anterior tibial area; + fissure to R heel. + flaky dry skin to BL heels    LABS:              x                    140  | 22   | 26           x     >-----------< x       ------------------------< 144                   x                    4.2  | 99   | 0.99                                         Ca 9.5   Mg x     Ph x           TPro  6.5  /  Alb  3.6      TBili  1.4  /  DBili  0.4        AST  89  /  ALT  110            AlkPhos  54      INR: 1.36<H>;    PT: 15.2 SEC<H>;    PTT: 28.6 SEC    CARDIAC MARKERS  0.32 ng/mL / 166 u/L / 9.31 ng/mL  CARDIAC MARKERS  0.41 ng/mL / 220 u/L / 14.60 ng/mL  CARDIAC MARKERS  0.31 ng/mL / 254 u/L / 17.93 ng/mL    VB-31 @ 13:21  7.34/44/27/21/35.8/-2.1    RADIOLOGY & ADDITIONAL TESTS:  Imaging Personally Reviewed:    [ ] Consultant(s) Notes Reviewed:  [ ] Care Discussed with Consultants/Other Providers:

## 2018-01-01 NOTE — PROGRESS NOTE ADULT - PROBLEM SELECTOR PLAN 1
-CT head  with hypodensity within the left cerebellum may represent an acute to subacute infarct.  -Neuro eval appreciated. Will obtain further imaging with MRI/MRA and 2D echo. OK to keep normotensive since sx started 4 days ago.  ASA 81 mg  Uptitrate statin, check FLP  Will check dysphagia screen  Official Swallow eval ordered  PT/OT  Neuro checks q 4

## 2018-01-01 NOTE — PROGRESS NOTE ADULT - ASSESSMENT
77m with hx HTN. DM, HLD a/w acute CVA and elevated CE    77M  h/o DM, HTN, HLD, remote CVA x 2012 with no residual weakness, acute onset of chest pain with SOB 4 days ago which has resolved now p/w dizziness, unsteadiness, falls, slurred speech x4 days admitted for CVA work-up and r/o ACS.

## 2018-01-02 NOTE — OCCUPATIONAL THERAPY INITIAL EVALUATION ADULT - DIAGNOSIS, OT EVAL
r/o CVA; Garbled speech; AAO x2; Decreased Left UE fine-motor coordination; Decreased standing balance; Decreased functional mobility; Decreased ADL management

## 2018-01-02 NOTE — OCCUPATIONAL THERAPY INITIAL EVALUATION ADULT - PLANNED THERAPY INTERVENTIONS, OT EVAL
ROM/strengthening/fine motor coordination training/neuromuscular re-education/transfer training/bed mobility training/ADL retraining/balance training

## 2018-01-02 NOTE — OCCUPATIONAL THERAPY INITIAL EVALUATION ADULT - MD ORDER
Occupational Therapy (OT) to evaluate and treat. Occupational Therapy (OT) to evaluate and treat. Out of bed with assistance. Per SEAN Longo, pt is okay to participate in OT evaluation and perform activity as tolerated.

## 2018-01-02 NOTE — PROGRESS NOTE ADULT - SUBJECTIVE AND OBJECTIVE BOX
CHIEF COMPLAINT: Patient is a 77y old  male who presents with a chief complaint of dizziness/falls (31 Dec 2017 16:50)    SUBJECTIVE / OVERNIGHT EVENTS: Patient seen and examined. No acute events overnight.  Wants to eat  DW staff- unable to tolerate complete MRI study       MEDICATIONS  (STANDING):  aspirin  chewable 81 milliGRAM(s) Oral daily  dextrose 5%. 1000 milliLiter(s) (50 mL/Hr) IV Continuous <Continuous>  dextrose 50% Injectable 12.5 Gram(s) IV Push once  dextrose 50% Injectable 25 Gram(s) IV Push once  dextrose 50% Injectable 25 Gram(s) IV Push once  enalapril 10 milliGRAM(s) Oral daily  heparin  Injectable 5000 Unit(s) SubCutaneous every 12 hours  influenza   Vaccine 0.5 milliLiter(s) IntraMuscular once  insulin lispro (HumaLOG) corrective regimen sliding scale   SubCutaneous three times a day before meals  insulin lispro (HumaLOG) corrective regimen sliding scale   SubCutaneous at bedtime  metoprolol    tartrate Injectable 5 milliGRAM(s) IV Push two times a day  simvastatin 40 milliGRAM(s) Oral at bedtime  sodium chloride 0.9%. 1000 milliLiter(s) (50 mL/Hr) IV Continuous <Continuous>    MEDICATIONS  (PRN):  dextrose Gel 1 Dose(s) Oral once PRN Blood Glucose LESS THAN 70 milliGRAM(s)/deciliter  glucagon  Injectable 1 milliGRAM(s) IntraMuscular once PRN Glucose LESS THAN 70 milligrams/deciliter      Vital Signs Last 24 Hrs  T(C): 36.6 (2018 05:17), Max: 36.6 (2018 14:25)  T(F): 97.8 (2018 05:17), Max: 97.9 (2018 14:25)  HR: 94 (2018 05:17) (82 - 120)  BP: 156/111 (2018 05:17) (137/94 - 156/111)  BP(mean): --  RR: 18 (2018 05:17) (18 - 18)  SpO2: 97% (2018 05:17) (97% - 100%)  Weight (kg): 81.647 (14:20)      PHYSICAL EXAM:  GENERAL: NAD, well-developed  CHEST/LUNG: Clear to auscultation bilaterally; No wheeze  HEART: Regular rate and rhythm; No murmurs, rubs, or gallops  ABDOMEN: Soft, Nontender, Nondistended; Bowel sounds present  EXTREMITIES:  2+ Peripheral Pulses, No clubbing, cyanosis, or edema; 4/5 RLE and 5/5 LLE  NEUROLOGY: AOOX 3   Speech- severe   dysarthria, no aphasia or neglect.  Cranial Nerves:  No facial asymmetry, Tongue, uvula and palate midline.    Dysmetria present on LUE and LLE  Gait- Ataxic       SKIN: + healing abrasion to L anterior tibial area; + fissure to R heel. + flaky dry skin to BL heels    LABS:                           16.3   14.36 )-----------( 196      ( 2018 07:40 )             50.6   01-02    144  |  104  |  51<H>  ----------------------------<  186<H>  4.9   |  17<L>  |  1.40<H>    Ca    9.5      2018 07:40  Mg     2.4     01-02    TPro  6.9  /  Alb  4.0  /  TBili  2.8<H>  /  DBili  1.2<H>  /  AST  1770<H>  /  ALT  1249<H>  /  AlkPhos  55  01-02    CARDIAC MARKERS  0.32 ng/mL / 166 u/L / 9.31 ng/mL  CARDIAC MARKERS  0.41 ng/mL / 220 u/L / 14.60 ng/mL  CARDIAC MARKERS  0.31 ng/mL / 254 u/L / 17.93 ng/mL    VB-31 @ 13:21  7.34/44/27/21/35.8/-2.1    RADIOLOGY & ADDITIONAL TESTS:  Imaging Personally Reviewed:    [ ] Consultant(s) Notes Reviewed:  [ ] Care Discussed with Consultants/Other Providers:Neuro

## 2018-01-02 NOTE — PHYSICAL THERAPY INITIAL EVALUATION ADULT - GENERAL OBSERVATIONS, REHAB EVAL
Patient received semi supine in bed , (+) tele monitor, (+) IV fluids, on observation , SEAN Longo and PCA at bedside with the patient.

## 2018-01-02 NOTE — SWALLOW BEDSIDE ASSESSMENT ADULT - PHARYNGEAL PHASE
no overt s/s of penetration/aspiration/Delayed pharyngeal swallow Delayed pharyngeal swallow/increased work of breathing for nectar thickened liquid trials, therefore thin liquids were not trialed.

## 2018-01-02 NOTE — PROGRESS NOTE ADULT - ASSESSMENT
78 y/o man with PMH HTN, DM, HLD p/w slurred speech, ataxia, dizziness and falls x several days. Neurological examination is stable, with findings of dysarthria and dysmetria on left side. CT head showed acute to subacute L cerebellar ischemic infarct. MRI brain performed, pending official read, with findings preliminarily consistent with L PICA ischemic infarct, uncertain etiology.    Recommend:  - If MRA head/neck could not be tolerated, recommend CTA head/neck w/ IV contrast vs carotid dopplers  - TTE w/ bubble study  -  mg per rectum while pt is NPO. Once pt has enteral access, recommend ASA 81 mg daily and lipitor 80 mg qhs  - Telemetry monitoring  - PT/OT/SS

## 2018-01-02 NOTE — PROGRESS NOTE ADULT - ASSESSMENT
77M  h/o DM, HTN, HLD, remote CVA x 2012 with no residual weakness, acute onset of chest pain with SOB 4 days ago which has resolved now p/w dizziness, unsteadiness, falls, slurred speech x4 days admitted for CVA work-up and r/o ACS.

## 2018-01-02 NOTE — PROGRESS NOTE ADULT - SUBJECTIVE AND OBJECTIVE BOX
Interval events:  No acute events. Troponin downtrended 0.41 -> 0.36. CK MB from 14 -> 9.  Patient sleeping without complaints on my exam.    Medications:  aspirin  chewable 81 milliGRAM(s) Oral daily  dextrose 5%. 1000 milliLiter(s) IV Continuous <Continuous>  dextrose 50% Injectable 12.5 Gram(s) IV Push once  dextrose 50% Injectable 25 Gram(s) IV Push once  dextrose 50% Injectable 25 Gram(s) IV Push once  dextrose Gel 1 Dose(s) Oral once PRN  enalapril 10 milliGRAM(s) Oral daily  glucagon  Injectable 1 milliGRAM(s) IntraMuscular once PRN  heparin  Injectable 5000 Unit(s) SubCutaneous every 12 hours  influenza   Vaccine 0.5 milliLiter(s) IntraMuscular once  insulin lispro (HumaLOG) corrective regimen sliding scale   SubCutaneous three times a day before meals  insulin lispro (HumaLOG) corrective regimen sliding scale   SubCutaneous at bedtime  metoprolol    tartrate Injectable 5 milliGRAM(s) IV Push two times a day  simvastatin 40 milliGRAM(s) Oral at bedtime  sodium chloride 0.9%. 1000 milliLiter(s) IV Continuous <Continuous>    Vitals:  T(C): 36.6 (01-02-18 @ 05:17), Max: 36.6 (01-01-18 @ 14:25)  HR: 94 (01-02-18 @ 05:17) (82 - 120)  BP: 156/111 (01-02-18 @ 05:17) (137/94 - 156/111)  BP(mean): --  RR: 18 (01-02-18 @ 05:17) (18 - 18)  SpO2: 97% (01-02-18 @ 05:17) (97% - 100%)  Wt(kg): --  Daily Height in cm: 167.64 (01 Jan 2018 19:30)    Daily   I&O's Summary    Physical Exam:  Appearance:  Normal, NAD  Eyes: PERRL, EOMI  HENT: Normal oral [place] NC/AT  Cardiovascular: S1, S2, RRR, No m/r/g appreciated, No edema, no elevation in JVP  Respiratory: Clear to auscultation bilaterally  Gastrointestinal: Soft, Non-tender, Non-distended, BS+  Musculoskeletal:  No clubbing, No joint deformity   Neurologic: Non-focal  Lymphatic: No lymphadenopathy  Psychiatry: AAOx3, Mood & affect appropriate  Skin: No rashes, No ecchymoses, No cyanosis    Labs:                        16.3   14.36 )-----------( 196      ( 02 Jan 2018 07:40 )             50.6     01-02    144  |  104  |  51<H>  ----------------------------<  186<H>  4.9   |  17<L>  |  1.40<H>    Ca    9.5      02 Jan 2018 07:40  Mg     2.4     01-02    TPro  6.9  /  Alb  4.0  /  TBili  2.8<H>  /  DBili  1.2<H>  /  AST  x   /  ALT  x   /  AlkPhos  55  01-02    PT/INR - ( 01 Jan 2018 12:45 )   PT: 15.7 SEC;   INR: 1.36          PTT - ( 31 Dec 2017 13:01 )  PTT:28.6 SEC  CARDIAC MARKERS ( 02 Jan 2018 07:40 )  x     / x     / 145 u/L / x     / x      CARDIAC MARKERS ( 01 Jan 2018 12:45 )  x     / 0.36 ng/mL / 160 u/L / x     / x      CARDIAC MARKERS ( 01 Jan 2018 05:01 )  x     / 0.32 ng/mL / 166 u/L / 9.31 ng/mL / x      CARDIAC MARKERS ( 31 Dec 2017 20:34 )  x     / 0.41 ng/mL / 220 u/L / 14.60 ng/mL / x      CARDIAC MARKERS ( 31 Dec 2017 13:01 )  x     / 0.31 ng/mL / 254 u/L / 17.93 ng/mL / x        New results/imaging:  None Interval events:  No acute events. Troponin downtrended 0.41 -> 0.36. CK MB from 14 -> 9.  Patient sleeping without complaints on my exam.    Tele: Sinus with PVCs    Medications:  aspirin  chewable 81 milliGRAM(s) Oral daily  dextrose 5%. 1000 milliLiter(s) IV Continuous <Continuous>  dextrose 50% Injectable 12.5 Gram(s) IV Push once  dextrose 50% Injectable 25 Gram(s) IV Push once  dextrose 50% Injectable 25 Gram(s) IV Push once  dextrose Gel 1 Dose(s) Oral once PRN  enalapril 10 milliGRAM(s) Oral daily  glucagon  Injectable 1 milliGRAM(s) IntraMuscular once PRN  heparin  Injectable 5000 Unit(s) SubCutaneous every 12 hours  influenza   Vaccine 0.5 milliLiter(s) IntraMuscular once  insulin lispro (HumaLOG) corrective regimen sliding scale   SubCutaneous three times a day before meals  insulin lispro (HumaLOG) corrective regimen sliding scale   SubCutaneous at bedtime  metoprolol    tartrate Injectable 5 milliGRAM(s) IV Push two times a day  simvastatin 40 milliGRAM(s) Oral at bedtime  sodium chloride 0.9%. 1000 milliLiter(s) IV Continuous <Continuous>    Vitals:  T(C): 36.6 (01-02-18 @ 05:17), Max: 36.6 (01-01-18 @ 14:25)  HR: 94 (01-02-18 @ 05:17) (82 - 120)  BP: 156/111 (01-02-18 @ 05:17) (137/94 - 156/111)  BP(mean): --  RR: 18 (01-02-18 @ 05:17) (18 - 18)  SpO2: 97% (01-02-18 @ 05:17) (97% - 100%)  Wt(kg): --  Daily Height in cm: 167.64 (01 Jan 2018 19:30)    Daily   I&O's Summary    Physical Exam:  Appearance:  Normal, NAD  Eyes: PERRL, EOMI  HENT: Normal oral mus[place]a NC/AT  Cardiovascular: S1, S2, RRR, No m/r/g appreciated, No edema, no elevation in JVP  Respiratory: Clear to auscultation bilaterally  Gastrointestinal: Soft, Non-tender, Non-distended, BS+  Musculoskeletal:  No clubbing, No joint deformity   Neurologic: Non-focal  Lymphatic: No lymphadenopathy  Psychiatry: AAOx3, Mood & affect appropriate  Skin: No rashes, No ecchymoses, No cyanosis    Labs:                        16.3   14.36 )-----------( 196      ( 02 Jan 2018 07:40 )             50.6     01-02    144  |  104  |  51<H>  ----------------------------<  186<H>  4.9   |  17<L>  |  1.40<H>    Ca    9.5      02 Jan 2018 07:40  Mg     2.4     01-02    TPro  6.9  /  Alb  4.0  /  TBili  2.8<H>  /  DBili  1.2<H>  /  AST  x   /  ALT  x   /  AlkPhos  55  01-02    PT/INR - ( 01 Jan 2018 12:45 )   PT: 15.7 SEC;   INR: 1.36          PTT - ( 31 Dec 2017 13:01 )  PTT:28.6 SEC  CARDIAC MARKERS ( 02 Jan 2018 07:40 )  x     / x     / 145 u/L / x     / x      CARDIAC MARKERS ( 01 Jan 2018 12:45 )  x     / 0.36 ng/mL / 160 u/L / x     / x      CARDIAC MARKERS ( 01 Jan 2018 05:01 )  x     / 0.32 ng/mL / 166 u/L / 9.31 ng/mL / x      CARDIAC MARKERS ( 31 Dec 2017 20:34 )  x     / 0.41 ng/mL / 220 u/L / 14.60 ng/mL / x      CARDIAC MARKERS ( 31 Dec 2017 13:01 )  x     / 0.31 ng/mL / 254 u/L / 17.93 ng/mL / x        New results/imaging:  None

## 2018-01-02 NOTE — PROGRESS NOTE ADULT - SUBJECTIVE AND OBJECTIVE BOX
Neurology Progress Note    S: No acute overnight events. Pt has no complaints. RN at bedside reports pt was only able to tolerate portion of MRI study.    Objective:   Vital Signs Last 24 Hrs  T(C): 36.6 (02 Jan 2018 05:17), Max: 36.6 (01 Jan 2018 14:25)  T(F): 97.8 (02 Jan 2018 05:17), Max: 97.9 (01 Jan 2018 14:25)  HR: 94 (02 Jan 2018 05:17) (82 - 120)  BP: 156/111 (02 Jan 2018 05:17) (137/94 - 156/111)  BP(mean): --  RR: 18 (02 Jan 2018 05:17) (18 - 18)  SpO2: 97% (02 Jan 2018 05:17) (97% - 100%)    General Adult Exam  GENERAL APPEARANCE: NAD    Neurological Exam:  Mental Status: Awake and alert, speech is fluent, follows simple midline and appendicular commands    Cranial Nerves: EOMI, no facial asymmetry.  Moderate to severe dysarthria.     Motor:   Tone: normal.                  Strength: Moving RUE and RLE spontaneously. LUE and LLE minimally 3/5  Tremor: No resting, postural or action tremor.  No myoclonus.    Coord: + Dysmetria LUE FTN      Other:      01-02    144  |  104  |  x   ----------------------------<  x   4.9   |  x   |  x     Ca    9.3      01 Jan 2018 12:45    TPro  6.5  /  Alb  3.6  /  TBili  1.4<H>  /  DBili  0.4<H>  /  AST  89<H>  /  ALT  110<H>  /  AlkPhos  54  01-01    LIVER FUNCTIONS - ( 01 Jan 2018 05:01 )  Alb: 3.6 g/dL / Pro: 6.5 g/dL / ALK PHOS: 54 u/L / ALT: 110 u/L / AST: 89 u/L / GGT: x          HbA1c 6.6,      MEDICATIONS  (STANDING):  aspirin  chewable 81 milliGRAM(s) Oral daily  dextrose 5%. 1000 milliLiter(s) (50 mL/Hr) IV Continuous <Continuous>  dextrose 50% Injectable 12.5 Gram(s) IV Push once  dextrose 50% Injectable 25 Gram(s) IV Push once  dextrose 50% Injectable 25 Gram(s) IV Push once  enalapril 10 milliGRAM(s) Oral daily  heparin  Injectable 5000 Unit(s) SubCutaneous every 12 hours  influenza   Vaccine 0.5 milliLiter(s) IntraMuscular once  insulin lispro (HumaLOG) corrective regimen sliding scale   SubCutaneous three times a day before meals  insulin lispro (HumaLOG) corrective regimen sliding scale   SubCutaneous at bedtime  metoprolol    tartrate Injectable 5 milliGRAM(s) IV Push two times a day  simvastatin 40 milliGRAM(s) Oral at bedtime  sodium chloride 0.9%. 1000 milliLiter(s) (50 mL/Hr) IV Continuous <Continuous>    MEDICATIONS  (PRN):  dextrose Gel 1 Dose(s) Oral once PRN Blood Glucose LESS THAN 70 milliGRAM(s)/deciliter  glucagon  Injectable 1 milliGRAM(s) IntraMuscular once PRN Glucose LESS THAN 70 milligrams/deciliter

## 2018-01-02 NOTE — DISCHARGE NOTE ADULT - PATIENT PORTAL LINK FT
“You can access the [employer] Patient Portal, offered by [facility], by registering with the following website: [email or web address]”

## 2018-01-02 NOTE — SWALLOW BEDSIDE ASSESSMENT ADULT - SWALLOW EVAL: DIAGNOSIS
Patient's oral stage for puree, honey thickened liquids and nectar thickened liquids marked by adequate bolus acceptance, labial closure for spoon stripping and cup drinking, adequate orientation to feeding utensils, with decreased bolus formation/manipulation resulting in delayed anterior-posterior movement, delayed oral transit however adequate oral clearance.  Patient presents with pharyngeal stage dysphagia marked by delayed swallow trigger and decreased hyolaryngeal elevation upon digital palpation, however no overt signs or symptoms of penetration/aspiration.  Pharyngeal stage dysphagia for nectar thickened liquids marked by delayed swallow trigger, decreased hyolarygneal elevation and increased work of breathing after swallow indicative of overt signs and symptoms of penetration/aspiration.

## 2018-01-02 NOTE — PROGRESS NOTE ADULT - PROBLEM SELECTOR PLAN 1
-CT head  with hypodensity within the left cerebellum may represent an acute to subacute infarct.  MRI  with an acute infarct is again noted involving the left cerebellum as described, with new areas of patchy internal hemorrhagic transformation.   MRA head/neck-  High-grade stenosis versus occlusion likely involves the distal left superior cerebellar artery  DW Neuro-re- hhgic transformation. Neuro to reviews imaging  Await further recommendations  On ASA 81 mg/Neuro checks q 4  Await TTE   Await Neuro - re ILR

## 2018-01-02 NOTE — PROGRESS NOTE ADULT - ASSESSMENT
77 year old male with PMH of HTN, HLD, DM presents with acute stroke left cerebellum, patient reports he had chest pain 4 days ago at home, no current complaints of chest pain. In ED, CT scan of head reveals acute stroke, patient with elevated troponin and +CPK +CKMB.    -- likely in setting of stroke  -- f/u TTE  -- continue aspirin, atorva 40 mg, metoprolol    London Becker MD

## 2018-01-02 NOTE — OCCUPATIONAL THERAPY INITIAL EVALUATION ADULT - LIVES WITH, PROFILE
Pt. reports he lives with his wife and daughter in an apartment building with 2 steps to enter. Once inside, pt. reports he has an elevator available to reach his apartment located on the 3rd floor. Per pt., he has a bathtub in his bathroom.

## 2018-01-02 NOTE — SWALLOW BEDSIDE ASSESSMENT ADULT - SWALLOW EVAL: RECOMMENDED FEEDING/EATING TECHNIQUES
position upright (90 degrees)/allow for swallow between intakes/alternate food with liquid/small sips/bites/maintain upright posture during/after eating for 30 mins

## 2018-01-02 NOTE — OCCUPATIONAL THERAPY INITIAL EVALUATION ADULT - PERTINENT HX OF CURRENT PROBLEM, REHAB EVAL
Pt is a 77 year old Male with hx of DM, HTN, HLD, and remote CVA with no residual effects, presented to [facility] on 12/31/17 with episodes of dizziness and falls for past 4 days along with slurred speech. CT Head No Contrast on 12/31/17 displayed hypodensity within the left cerebellum may represent an acute to subacute infarct.

## 2018-01-03 NOTE — PROGRESS NOTE ADULT - ASSESSMENT
78 y/o man with PMH HTN, DM, HLD p/w slurred speech, ataxia, dizziness and falls x several days. Neurological examination is stable, with findings of dysarthria and dysmetria on left side. CT head showed acute to subacute L cerebellar ischemic infarct. MRI brain showed acute infarct in the superior aspect of the left cerebellar hemisphere with hemorrhagic conversion. MRA head/neck showed high grade stenosis of R ICA cavernous segment, L vertebral, and L superior cerebellar artery. Carotid doppler showed only mild stenosis bilaterally.    Left cerebellar infarct secondary to intracranial atherosclerosis    Recommend:  - TTE w/ bubble study for possible valvular heart disease  - ASA 81  - Plavix 75 for 3 months then just ASA 81 alone  - Lipitor 80  - Continue with aggressive vascular risk factors modifications  - DVT prophylaxis with heparin  - Telemetry monitoring  - PT/OT/SS 78 y/o man with PMH HTN, DM, HLD p/w slurred speech, ataxia, dizziness and falls x several days. Neurological examination is stable, with findings of dysarthria and dysmetria on left side. CT head showed acute to subacute L cerebellar ischemic infarct. MRI brain showed acute infarct in the superior aspect of the left cerebellar hemisphere with hemorrhagic conversion. MRA head/neck showed high grade stenosis of R ICA cavernous segment, L vertebral, and L superior cerebellar artery. Carotid doppler showed only mild stenosis bilaterally.    On neuro exam today, patient has eyes open and has severe dysarthria. Follows simple commands with hesitancy. Minimal effort against gravity in all extremities however may be secondary to lack of effort.  Code stroke called today due to increasing lethargy. CT head shows stable L cerebellar infarct.   tPA not given as patient's symptoms likely secondary to metabolic encephalopathy. LFT noted to be over 2000, INR >2, glucose initially over 400.    Left cerebellar infarct secondary to intracranial atherosclerosis    Recommend:  - TTE w/ bubble study for possible valvular heart disease  - When liver function improves, restart ASA 81, Plavix 75 for 3 months then just ASA 81 alone, Lipitor 80, and DVT prophylaxis  - Continue with aggressive vascular risk factors modifications  - Telemetry monitoring  - PT/OT/SS 76 y/o man with PMH HTN, DM, HLD p/w slurred speech, ataxia, dizziness and falls x several days. Neurological examination is stable, with findings of dysarthria and dysmetria on left side. CT head showed acute to subacute L cerebellar ischemic infarct. MRI brain showed acute infarct in the superior aspect of the left cerebellar hemisphere with hemorrhagic conversion. MRA head/neck showed high grade stenosis of R ICA cavernous segment, L vertebral, and L superior cerebellar artery. Carotid doppler showed only mild stenosis bilaterally. He Therefore had a left SCA infarct due to intracranial large artery atherosclerosis, possibly with artery-artery embolism.    On neuro exam today, patient has eyes open and has severe dysarthria. Follows simple commands with hesitancy. Minimal effort against gravity in all extremities however may be secondary to lack of effort.  Code stroke called today due to increasing lethargy. CT head shows stable L cerebellar infarct.   tPA not given as patient's symptoms likely secondary to metabolic encephalopathy. LFT noted to be over 2000, INR >2, glucose initially over 400.    Left cerebellar infarct secondary to intracranial atherosclerosis    Recommend:  - TTE w/ bubble study for possible valvular heart disease  - When liver function improves and INR normalizes, restart ASA 81, Plavix 75 for 3 months then just ASA 81 alone, Lipitor 80, and DVT prophylaxis  - Continue with aggressive vascular risk factors modifications  - Telemetry monitoring  - PT/OT/SS

## 2018-01-03 NOTE — PROGRESS NOTE ADULT - SUBJECTIVE AND OBJECTIVE BOX
CHIEF COMPLAINT: Patient is a 77y old  male who presents with a chief complaint of dizziness/falls (31 Dec 2017 16:50)    SUBJECTIVE / OVERNIGHT EVENTS: Patient seen and examined.   Pt with acute  change in mental status  this AM. Not following commands, non verbal  Code Stroke called , Repeat CT head- no acute infarct   On my repeat evaluation - pt talking  a few sentences - raised his hand on command    MEDICATIONS  (STANDING):  aspirin  chewable 81 milliGRAM(s) Oral daily  dextrose 5%. 1000 milliLiter(s) (50 mL/Hr) IV Continuous <Continuous>  dextrose 50% Injectable 12.5 Gram(s) IV Push once  dextrose 50% Injectable 25 Gram(s) IV Push once  dextrose 50% Injectable 25 Gram(s) IV Push once  heparin  Injectable 5000 Unit(s) SubCutaneous every 12 hours  influenza   Vaccine 0.5 milliLiter(s) IntraMuscular once  insulin lispro (HumaLOG) corrective regimen sliding scale   SubCutaneous three times a day before meals  insulin lispro (HumaLOG) corrective regimen sliding scale   SubCutaneous at bedtime  LORazepam     Tablet 0.5 milliGRAM(s) Oral once  metoprolol     tartrate 25 milliGRAM(s) Oral two times a day    MEDICATIONS  (PRN):  dextrose Gel 1 Dose(s) Oral once PRN Blood Glucose LESS THAN 70 milliGRAM(s)/deciliter  glucagon  Injectable 1 milliGRAM(s) IntraMuscular once PRN Glucose LESS THAN 70 milligrams/deciliter    Vital Signs Last 24 Hrs  T(C): 36.6 (2018 05:17), Max: 36.6 (2018 14:25)  T(F): 97.8 (2018 05:17), Max: 97.9 (2018 14:25)  HR: 94 (2018 05:17) (82 - 120)  BP: 156/111 (2018 05:17) (137/94 - 156/111)  BP(mean): --  RR: 18 (2018 05:17) (18 - 18)  SpO2: 97% (2018 05:17) (97% - 100%)  Weight (kg): 81.647 (14:20)      PHYSICAL EXAM:  GENERAL:staring blankly, not following commands   CHEST/LUNG: Clear to auscultation bilaterally; No wheeze  HEART: Regular rate and rhythm; No murmurs, rubs, or gallops  ABDOMEN: Soft, Nontender, Nondistended; Bowel sounds present  EXTREMITIES:  2+ Peripheral Pulses, No clubbing, cyanosis, or edema; 4/5 RLE and 5/5 LLE  NEUROLOGY: AOOX 0 , no tone, in all 4 extremities   Speech- severe   dysarthria, no aphasia or neglect.  Cranial Nerves:  No facial asymmetry, Tongue, uvula and palate midline.    Dysmetria present on LUE and LLE  Gait- not tested today       SKIN: + healing abrasion to L anterior tibial area; + fissure to R heel. + flaky dry skin to BL heels    LABS:                               16.3   16.11 )-----------( 129      ( 2018 09:32 )             50.6   -    148<H>  |  109<H>  |  76<H>  ----------------------------<  185<H>  4.7   |  17<L>  |  1.82<H>    Ca    9.6      2018 09:32  Mg     2.5     -    TPro  6.7  /  Alb  3.8  /  TBili  4.1<H>  /  DBili  x   /  AST  2629<H>  /  ALT  7<H>  /  AlkPhos  60  -    VB-31 @ 13:21  7.34/44/27/21/35.8/-2.1    RADIOLOGY & ADDITIONAL TESTS:  Imaging Personally Reviewed:    [ ] Consultant(s) Notes Reviewed:  [ ] Care Discussed with Consultants/Other Providers:Neuro

## 2018-01-03 NOTE — PROGRESS NOTE ADULT - SUBJECTIVE AND OBJECTIVE BOX
Interval events:  Patient resting comfortably. Troponin from 0.36 -> 0.4. No complaints of chest pain or SOB.    Medications:  aspirin  chewable 81 milliGRAM(s) Oral daily  dextrose 5%. 1000 milliLiter(s) IV Continuous <Continuous>  dextrose 50% Injectable 12.5 Gram(s) IV Push once  dextrose 50% Injectable 25 Gram(s) IV Push once  dextrose 50% Injectable 25 Gram(s) IV Push once  dextrose Gel 1 Dose(s) Oral once PRN  glucagon  Injectable 1 milliGRAM(s) IntraMuscular once PRN  heparin  Injectable 5000 Unit(s) SubCutaneous every 12 hours  influenza   Vaccine 0.5 milliLiter(s) IntraMuscular once  insulin lispro (HumaLOG) corrective regimen sliding scale   SubCutaneous three times a day before meals  insulin lispro (HumaLOG) corrective regimen sliding scale   SubCutaneous at bedtime  LORazepam     Tablet 0.5 milliGRAM(s) Oral once  metoprolol     tartrate 25 milliGRAM(s) Oral two times a day    Vitals:  T(C): 36.7 (01-03-18 @ 05:51), Max: 36.8 (01-02-18 @ 22:48)  HR: 90 (01-03-18 @ 05:51) (90 - 105)  BP: 150/93 (01-03-18 @ 05:51) (141/92 - 153/112)  BP(mean): --  RR: 18 (01-03-18 @ 05:51) (18 - 19)  SpO2: 99% (01-03-18 @ 05:51) (99% - 100%)  Wt(kg): --  Daily     Daily   I&O's Summary      Physical Exam:  Appearance:  Normal, NAD  Eyes: PERRL, EOMI  HENT: Normal oral [place] NC/AT  Cardiovascular: S1, S2, RRR, No m/r/g appreciated, No edema, no elevation in JVP  Respiratory: Clear to auscultation bilaterally  Gastrointestinal: Soft, Non-tender, Non-distended, BS+  Musculoskeletal:  No clubbing, No joint deformity   Neurologic: Non-focal  Lymphatic: No lymphadenopathy  Psychiatry: AAOx3, Mood & affect appropriate  Skin: No rashes, No ecchymoses, No cyanosis    Labs:                        16.3   14.36 )-----------( 196      ( 02 Jan 2018 07:40 )             50.6     01-02    144  |  104  |  51<H>  ----------------------------<  186<H>  4.9   |  17<L>  |  1.40<H>    Ca    9.5      02 Jan 2018 07:40  Mg     2.4     01-02    TPro  6.9  /  Alb  4.0  /  TBili  2.8<H>  /  DBili  1.2<H>  /  AST  1770<H>  /  ALT  1249<H>  /  AlkPhos  55  01-02    PT/INR - ( 01 Jan 2018 12:45 )   PT: 15.7 SEC;   INR: 1.36       CARDIAC MARKERS ( 02 Jan 2018 07:40 )  x     / 0.42 ng/mL / 145 u/L / x     / x      CARDIAC MARKERS ( 01 Jan 2018 12:45 )  x     / 0.36 ng/mL / 160 u/L / x     / x          Serum Pro-Brain Natriuretic Peptide: 03990 pg/mL (01-02 @ 07:40)    New results/imaging:  MR Head  BRAIN MRI: An acute infarct is again noted involving the left cerebellum   as described, with new areas of patchy internal hemorrhagic   transformation.    BRAIN MRA:  Intracranial stenoses are noted as described. High-grade   stenosis versus occlusion likely involves the distal left superior   cerebellar artery. The study is substantially limited by motion. Interval events:  Patient resting comfortably. Troponin from 0.36 -> 0.4. No complaints of chest pain or SOB.    Tele: sinus 80s, PVCs and 3 bt NSVT    Medications:  aspirin  chewable 81 milliGRAM(s) Oral daily  dextrose 5%. 1000 milliLiter(s) IV Continuous <Continuous>  dextrose 50% Injectable 12.5 Gram(s) IV Push once  dextrose 50% Injectable 25 Gram(s) IV Push once  dextrose 50% Injectable 25 Gram(s) IV Push once  dextrose Gel 1 Dose(s) Oral once PRN  glucagon  Injectable 1 milliGRAM(s) IntraMuscular once PRN  heparin  Injectable 5000 Unit(s) SubCutaneous every 12 hours  influenza   Vaccine 0.5 milliLiter(s) IntraMuscular once  insulin lispro (HumaLOG) corrective regimen sliding scale   SubCutaneous three times a day before meals  insulin lispro (HumaLOG) corrective regimen sliding scale   SubCutaneous at bedtime  LORazepam     Tablet 0.5 milliGRAM(s) Oral once  metoprolol     tartrate 25 milliGRAM(s) Oral two times a day    Vitals:  T(C): 36.7 (01-03-18 @ 05:51), Max: 36.8 (01-02-18 @ 22:48)  HR: 90 (01-03-18 @ 05:51) (90 - 105)  BP: 150/93 (01-03-18 @ 05:51) (141/92 - 153/112)  BP(mean): --  RR: 18 (01-03-18 @ 05:51) (18 - 19)  SpO2: 99% (01-03-18 @ 05:51) (99% - 100%)  Wt(kg): --  Daily     Daily   I&O's Summary      Physical Exam:  Appearance:  Normal, NAD  Eyes: PERRL, EOMI  HENT: Normal oral [place] NC/AT  Cardiovascular: S1, S2, RRR, No m/r/g appreciated, No edema, no elevation in JVP  Respiratory: Clear to auscultation bilaterally  Gastrointestinal: Soft, Non-tender, Non-distended, BS+  Musculoskeletal:  No clubbing, No joint deformity   Neurologic: Non-focal  Lymphatic: No lymphadenopathy  Psychiatry: AAOx3, Mood & affect appropriate  Skin: No rashes, No ecchymoses, No cyanosis    Labs:                        16.3   14.36 )-----------( 196      ( 02 Jan 2018 07:40 )             50.6     01-02    144  |  104  |  51<H>  ----------------------------<  186<H>  4.9   |  17<L>  |  1.40<H>    Ca    9.5      02 Jan 2018 07:40  Mg     2.4     01-02    TPro  6.9  /  Alb  4.0  /  TBili  2.8<H>  /  DBili  1.2<H>  /  AST  1770<H>  /  ALT  1249<H>  /  AlkPhos  55  01-02    PT/INR - ( 01 Jan 2018 12:45 )   PT: 15.7 SEC;   INR: 1.36       CARDIAC MARKERS ( 02 Jan 2018 07:40 )  x     / 0.42 ng/mL / 145 u/L / x     / x      CARDIAC MARKERS ( 01 Jan 2018 12:45 )  x     / 0.36 ng/mL / 160 u/L / x     / x          Serum Pro-Brain Natriuretic Peptide: 41358 pg/mL (01-02 @ 07:40)    New results/imaging:  MR Head  BRAIN MRI: An acute infarct is again noted involving the left cerebellum   as described, with new areas of patchy internal hemorrhagic   transformation.    BRAIN MRA:  Intracranial stenoses are noted as described. High-grade   stenosis versus occlusion likely involves the distal left superior   cerebellar artery. The study is substantially limited by motion.

## 2018-01-03 NOTE — CONSULT NOTE ADULT - ASSESSMENT
76 yo man who was admitted to [facility] on 12/31/17 for acute stroke, who has subsequently been found to have markedly elevated aminotransferases with prolongation of PT/INR concerning for acute liver failure. He has had altered mental status this AM further concerning for acute liver failure vs mental status charges related to his underlying stroke. There is no documented history of hypotension, however ischemic hepatopathy (PVT, acute Bud Chiari, other hepatic thrombosis) vs acute viral hepatitis vs drug/toxin mediated liver toxicity are all in the ddx. 78 yo man who was admitted to [facility] on 12/31/17 for acute stroke, found to have elevated cardiac enzymes, who has subsequently been found to have markedly elevated aminotransferases with prolongation of PT/INR concerning for acute liver failure. He has had altered mental status this AM further concerning for acute liver failure vs mental status charges related to his underlying stroke. There is no documented history of hypotension, however ischemic hepatopathy vs acute viral hepatitis vs drug/toxin mediated liver toxicity > AIH are all in the ddx.

## 2018-01-03 NOTE — CONSULT NOTE ADULT - PROBLEM SELECTOR RECOMMENDATION 9
currently getting work up for lethargy   continue Pt/OT when more responsive   Will follow for dispo recs

## 2018-01-03 NOTE — PROGRESS NOTE ADULT - PROBLEM SELECTOR PLAN 7
-c/w heparin subq
-likely reactive. no overt signs of infection. monitor for Aspiration. If febrile, consider starting emipiric Abx with Unasyn for possible aspiration (pt has been eating despite trouble swallowing)  -f/u CBC with differential from today
-likely reactive. no overt signs of infection. monitor for Aspiration. If febrile, consider starting emipiric Abx with Unasyn for possible aspiration (pt has been eating despite trouble swallowing)  -f/u CBC with differential from today

## 2018-01-03 NOTE — CONSULT NOTE ADULT - SUBJECTIVE AND OBJECTIVE BOX
Date of Admission:    CHIEF COMPLAINT:    HISTORY OF PRESENT ILLNESS:      Allergies    No Known Allergies    Intolerances    	    MEDICATIONS:  metoprolol     tartrate 25 milliGRAM(s) Oral two times a day        LORazepam     Tablet 0.5 milliGRAM(s) Oral once      dextrose 50% Injectable 12.5 Gram(s) IV Push once  dextrose 50% Injectable 25 Gram(s) IV Push once  dextrose 50% Injectable 25 Gram(s) IV Push once  dextrose Gel 1 Dose(s) Oral once PRN  glucagon  Injectable 1 milliGRAM(s) IntraMuscular once PRN  insulin lispro (HumaLOG) corrective regimen sliding scale   SubCutaneous three times a day before meals  insulin lispro (HumaLOG) corrective regimen sliding scale   SubCutaneous at bedtime    dextrose 5%. 1000 milliLiter(s) IV Continuous <Continuous>  influenza   Vaccine 0.5 milliLiter(s) IntraMuscular once  sodium chloride 0.9%. 1000 milliLiter(s) IV Continuous <Continuous>      PAST MEDICAL & SURGICAL HISTORY:  CVA (cerebral vascular accident)  DM (diabetes mellitus)  HTN (hypertension)  No significant past surgical history      FAMILY HISTORY:  No pertinent family history in first degree relatives      SOCIAL HISTORY:    [ ] Non-smoker  [ ] Smoker  [ ] Alcohol      REVIEW OF SYSTEMS:  CONSTITUTIONAL: No fever, weight loss, or fatigue  EYES: No eye pain, visual disturbances, or discharge  ENMT:  No difficulty hearing, tinnitus, vertigo; No sinus or throat pain  NECK: No pain or stiffness  RESPIRATORY: No cough, wheezing, chills or hemoptysis; No Shortness of Breath  CARDIOVASCULAR: No chest pain, palpitations, passing out, dizziness, or leg swelling  GASTROINTESTINAL: No abdominal or epigastric pain. No nausea, vomiting, or hematemesis; No diarrhea or constipation. No melena or hematochezia.  GENITOURINARY: No dysuria, frequency, hematuria, or incontinence  NEUROLOGICAL: No headaches, memory loss, loss of strength, numbness, or tremors  SKIN: No itching, burning, rashes, or lesions   LYMPH Nodes: No enlarged glands  ENDOCRINE: No heat or cold intolerance; No hair loss  MUSCULOSKELETAL: No joint pain or swelling; No muscle, back, or extremity pain  PSYCHIATRIC: No depression, anxiety, mood swings, or difficulty sleeping  HEME/LYMPH: No easy bruising, or bleeding gums  ALLERY AND IMMUNOLOGIC: No hives or eczema	    [ ] All others negative	  [ ] Unable to obtain    PHYSICAL EXAM:  T(C): 36.4 (01-03-18 @ 14:48), Max: 36.8 (01-02-18 @ 22:48)  HR: 88 (01-03-18 @ 14:48) (78 - 94)  BP: 152/112 (01-03-18 @ 14:48) (141/92 - 153/112)  RR: 19 (01-03-18 @ 14:48) (18 - 19)  SpO2: 97% (01-03-18 @ 14:48) (96% - 99%)  Wt(kg): --  I&O's Summary      Appearance: Normal	  HEENT:   Normal oral mucosa, PERRL, EOMI	  Lymphatic: No lymphadenopathy  Cardiovascular: Normal S1 S2, No JVD, No murmurs, No edema  Respiratory: Lungs clear to auscultation	  Psychiatry: A & O x 3, Mood & affect appropriate  Gastrointestinal:  Soft, Non-tender, + BS	  Skin: No rashes, No ecchymoses, No cyanosis	  Neurologic: Non-focal  Extremities: Normal range of motion, No clubbing, cyanosis or edema  Vascular: Peripheral pulses palpable 2+ bilaterally        LABS:	 	    CBC Full  -  ( 03 Jan 2018 09:32 )  WBC Count : 16.11 K/uL  Hemoglobin : 16.3 g/dL  Hematocrit : 50.6 %  Platelet Count - Automated : 129 K/uL  Mean Cell Volume : 92.3 fL  Mean Cell Hemoglobin : 29.7 pg  Mean Cell Hemoglobin Concentration : 32.2 %  Auto Neutrophil # : x  Auto Lymphocyte # : x  Auto Monocyte # : x  Auto Eosinophil # : x  Auto Basophil # : x  Auto Neutrophil % : x  Auto Lymphocyte % : x  Auto Monocyte % : x  Auto Eosinophil % : x  Auto Basophil % : x    01-03    148<H>  |  109<H>  |  76<H>  ----------------------------<  185<H>  4.7   |  17<L>  |  1.82<H>  01-03    148<H>  |  109<H>  |  73<H>  ----------------------------<  173<H>  4.7   |  16<L>  |  1.78<H>    Ca    9.6      03 Jan 2018 09:32  Ca    9.6      03 Jan 2018 06:25  Mg     2.5     01-03  Mg     2.4     01-02    TPro  6.7  /  Alb  3.8  /  TBili  4.1<H>  /  DBili  x   /  AST  2629<H>  /  ALT  2027<H>  /  AlkPhos  60  01-03  TPro  6.9  /  Alb  4.0  /  TBili  2.8<H>  /  DBili  1.2<H>  /  AST  1770<H>  /  ALT  1249<H>  /  AlkPhos  55  01-02          CKMB: 9.31 ng/mL (01-01 @ 05:01)  CKMB: 14.60 ng/mL (12-31 @ 20:34)    CKMB Relative Index: 6.6 (12-31 @ 20:34)    < from: Xray Chest 2 Views PA/Lat (12.31.17 @ 13:36) >  Blunted posterior costophrenic angles on the lateral view suggesting   small pleural reactions. Bibasilar strand-like opacities compatible with   subsegmental atelectatic changes or scarring. Clear remaining visualized   lungs. Nopneumothorax.    Generalized mild osteopenia and mild spinal degenerative changes.    Enlarged appearing cardiac and mediastinal silhouettes.     Trachea midline.    Unremarkable osseous structures.    < end of copied text > Date of Admission:    CHIEF COMPLAINT:     HISTORY OF PRESENT ILLNESS:    76 y/o male w/ PMHX of HTN, DM, HLD comes in w/ complaints of dizziness found to have an acute CVA, now transferred to MICU.     Allergies    No Known Allergies    Intolerances    	    MEDICATIONS:  metoprolol     tartrate 25 milliGRAM(s) Oral two times a day  LORazepam     Tablet 0.5 milliGRAM(s) Oral once  dextrose 50% Injectable 12.5 Gram(s) IV Push once  dextrose 50% Injectable 25 Gram(s) IV Push once  dextrose 50% Injectable 25 Gram(s) IV Push once  dextrose Gel 1 Dose(s) Oral once PRN  glucagon  Injectable 1 milliGRAM(s) IntraMuscular once PRN  insulin lispro (HumaLOG) corrective regimen sliding scale   SubCutaneous three times a day before meals  insulin lispro (HumaLOG) corrective regimen sliding scale   SubCutaneous at bedtime  dextrose 5%. 1000 milliLiter(s) IV Continuous <Continuous>  influenza   Vaccine 0.5 milliLiter(s) IntraMuscular once  sodium chloride 0.9%. 1000 milliLiter(s) IV Continuous <Continuous>      PAST MEDICAL & SURGICAL HISTORY:  CVA (cerebral vascular accident)  DM (diabetes mellitus)  HTN (hypertension)  No significant past surgical history      FAMILY HISTORY:  No pertinent family history in first degree relatives      SOCIAL HISTORY:      REVIEW OF SYSTEMS:  CONSTITUTIONAL: No fever, weight loss, or fatigue  EYES: No eye pain, visual disturbances, or discharge  ENMT:  No difficulty hearing, tinnitus, vertigo; No sinus or throat pain  NECK: No pain or stiffness  RESPIRATORY: No cough, wheezing, chills or hemoptysis; No Shortness of Breath  CARDIOVASCULAR: No chest pain, palpitations, passing out, dizziness, or leg swelling  GASTROINTESTINAL: No abdominal or epigastric pain. No nausea, vomiting, or hematemesis; No diarrhea or constipation. No melena or hematochezia.  GENITOURINARY: No dysuria, frequency, hematuria, or incontinence  NEUROLOGICAL: No headaches, memory loss, loss of strength, numbness, or tremors  SKIN: No itching, burning, rashes, or lesions   LYMPH Nodes: No enlarged glands  ENDOCRINE: No heat or cold intolerance; No hair loss  MUSCULOSKELETAL: No joint pain or swelling; No muscle, back, or extremity pain  PSYCHIATRIC: No depression, anxiety, mood swings, or difficulty sleeping  HEME/LYMPH: No easy bruising, or bleeding gums  ALLERY AND IMMUNOLOGIC: No hives or eczema	    PHYSICAL EXAM:  T(C): 36.4 (01-03-18 @ 14:48), Max: 36.8 (01-02-18 @ 22:48)  HR: 88 (01-03-18 @ 14:48) (78 - 94)  BP: 152/112 (01-03-18 @ 14:48) (141/92 - 153/112)  RR: 19 (01-03-18 @ 14:48) (18 - 19)  SpO2: 97% (01-03-18 @ 14:48) (96% - 99%)  Wt(kg): --  I&O's Summary      Appearance: Normal	  HEENT:   Normal oral mucosa, PERRL, EOMI	  Lymphatic: No lymphadenopathy  Cardiovascular: Normal S1 S2, No JVD, No murmurs, No edema  Respiratory: Lungs clear to auscultation	  Psychiatry: A & O x 3, Mood & affect appropriate  Gastrointestinal:  Soft, Non-tender, + BS	  Skin: No rashes, No ecchymoses, No cyanosis	  Neurologic: Non-focal  Extremities: Normal range of motion, No clubbing, cyanosis or edema  Vascular: Peripheral pulses palpable 2+ bilaterally        LABS:	 	    CBC Full  -  ( 03 Jan 2018 09:32 )  WBC Count : 16.11 K/uL  Hemoglobin : 16.3 g/dL  Hematocrit : 50.6 %  Platelet Count - Automated : 129 K/uL  Mean Cell Volume : 92.3 fL  Mean Cell Hemoglobin : 29.7 pg  Mean Cell Hemoglobin Concentration : 32.2 %  Auto Neutrophil # : x  Auto Lymphocyte # : x  Auto Monocyte # : x  Auto Eosinophil # : x  Auto Basophil # : x  Auto Neutrophil % : x  Auto Lymphocyte % : x  Auto Monocyte % : x  Auto Eosinophil % : x  Auto Basophil % : x    01-03    148<H>  |  109<H>  |  76<H>  ----------------------------<  185<H>  4.7   |  17<L>  |  1.82<H>  01-03    148<H>  |  109<H>  |  73<H>  ----------------------------<  173<H>  4.7   |  16<L>  |  1.78<H>    Ca    9.6      03 Jan 2018 09:32  Ca    9.6      03 Jan 2018 06:25  Mg     2.5     01-03  Mg     2.4     01-02    TPro  6.7  /  Alb  3.8  /  TBili  4.1<H>  /  DBili  x   /  AST  2629<H>  /  ALT  2027<H>  /  AlkPhos  60  01-03  TPro  6.9  /  Alb  4.0  /  TBili  2.8<H>  /  DBili  1.2<H>  /  AST  1770<H>  /  ALT  1249<H>  /  AlkPhos  55  01-02          CKMB: 9.31 ng/mL (01-01 @ 05:01)  CKMB: 14.60 ng/mL (12-31 @ 20:34)    CKMB Relative Index: 6.6 (12-31 @ 20:34)    < from: Xray Chest 2 Views PA/Lat (12.31.17 @ 13:36) >  Blunted posterior costophrenic angles on the lateral view suggesting   small pleural reactions. Bibasilar strand-like opacities compatible with   subsegmental atelectatic changes or scarring. Clear remaining visualized   lungs. Nopneumothorax.    Generalized mild osteopenia and mild spinal degenerative changes.    Enlarged appearing cardiac and mediastinal silhouettes.     Trachea midline.    Unremarkable osseous structures.    < end of copied text >

## 2018-01-03 NOTE — PROGRESS NOTE ADULT - ASSESSMENT
77 year old male with PMH of HTN, HLD, DM presents with acute stroke left cerebellum, patient reports he had chest pain 4 days ago at home, no current complaints of chest pain. In ED, CT scan of head reveals acute stroke, patient with elevated troponin and +CPK +CKMB. MR with left cerebellar stroke.    -- likely in setting of stroke  -- f/u TTE  -- continue aspirin, metoprolol  -- resume atorvastatin    London Becker MD 77 year old male with PMH of HTN, HLD, DM presents with acute stroke left cerebellum, patient reports he had chest pain 4 days ago at home, no current complaints of chest pain. In ED, CT scan of head reveals acute stroke, patient with elevated troponin and +CPK +CKMB. MR with left cerebellar stroke.    -- likely in setting of stroke  -- f/u TTE  -- continue aspirin  -- uptitrate metoprolol to 50 mg  -- resume atorvastatin    London Becker MD

## 2018-01-03 NOTE — PROGRESS NOTE ADULT - PROBLEM SELECTOR PLAN 1
with encephalopathy  Possibilities- Shock liver vs  Acute viral hepatitis vs Medication  Workup pending- Viral panel/ALPESH/AMA/Serum/urine tox/Abdominal ultrasound/Ammonia level   DW Hepatology - recommend CBC/CMP/INR q 6 hours  Ultrasound with doppler to r/o Portal vein thrombosis  Tylenol level/MICU consult   MICU called- dw Fellow  Heart failure consulted for possibility of congestive hepatopathy

## 2018-01-03 NOTE — CONSULT NOTE ADULT - PROBLEM SELECTOR RECOMMENDATION 9
- please check: HepA IgM, HepB SAg and HepB core IgM, HCV PCR, HSV PCR, EBV PCR, CMV PCR to assess for acute viral hepatitis  - please check: acetaminophen level and if elevated treat with NAC according to [facility]fonso nomogram  - please check US liver with doppler to assess for PVT or hepatic vein thrombus  - check PT/INR every 6 hours  - check FS glucose every 4-6 hours, if low please treat with dextrose infusion  - serial neurologic exams every 2 hours, call GI fellow for any acute changes in neurologic exam - please check: HepA IgM, HepB SAg and HepB core IgM, HCV PCR, HSV PCR, EBV PCR, CMV PCR to assess for acute viral hepatitis  - check ALPESH and smooth muscle Ab to assess for AIH  - please check: acetaminophen level from admission labs  - treat with empiric NAC for possible drug/toxin mediated liver injury  - please check US liver with doppler to assess for PVT or hepatic vein thrombus  - check PT/INR every 6 hours  - check FS glucose every 4-6 hours, if low please treat with dextrose infusion  - serial neurologic exams every 2 hours, call GI fellow for any acute changes in neurologic exam  - given his age and underlying vascular disease, patient is not a candidate for liver transplantation (this was discussed with attending, Dr. Coleman)

## 2018-01-03 NOTE — PROGRESS NOTE ADULT - SUBJECTIVE AND OBJECTIVE BOX
Subjective: Interval History - No events overnight    Objective:   Vital Signs Last 24 Hrs  T(C): 36.7 (03 Jan 2018 05:51), Max: 36.8 (02 Jan 2018 22:48)  T(F): 98.1 (03 Jan 2018 05:51), Max: 98.3 (02 Jan 2018 22:48)  HR: 90 (03 Jan 2018 05:51) (90 - 105)  BP: 150/93 (03 Jan 2018 05:51) (141/92 - 153/112)  RR: 18 (03 Jan 2018 05:51) (18 - 19)  SpO2: 99% (03 Jan 2018 05:51) (99% - 100%)    General Exam:   General appearance: No acute distress                   Neurological Exam:  Mental Status: Awake and alert, speech is fluent, follows simple midline and appendicular commands. Moderate to severe dysarthria.    Cranial Nerves: EOMI, VFF. No facial asymmetry. Tongue midline    Motor:   Drift:  LUE drift, RUE no  LLE drift, RLE no    Strength:   L deltoid 3/5 wrist extensor 3/5  R deltoid 5/5 wrist extensor 5/5  L iliopsoas 3/5 anterior tibialis 3/5  R iliopsoas 5/5 anterior tibialis 5/5    Tone: normal          Dysmetria: Left finger to nose dysmetria.    Sensation: intact to light touch, no extinction    Other:  01-02    144  |  104  |  51<H>  ----------------------------<  186<H>  4.9   |  17<L>  |  1.40<H>    Ca    9.5      02 Jan 2018 07:40  Mg     2.4     01-02    TPro  6.9  /  Alb  4.0  /  TBili  2.8<H>  /  DBili  1.2<H>  /  AST  1770<H>  /  ALT  1249<H>  /  AlkPhos  55  01-02    LIVER FUNCTIONS - ( 02 Jan 2018 07:40 )  Alb: 4.0 g/dL / Pro: 6.9 g/dL / ALK PHOS: 55 u/L / ALT: 1249 u/L / AST: 1770 u/L / GGT: x                       16.3   14.36 )-----------( 196      ( 02 Jan 2018 07:40 )             50.6     MEDICATIONS  (STANDING):  aspirin  chewable 81 milliGRAM(s) Oral daily  dextrose 5%. 1000 milliLiter(s) (50 mL/Hr) IV Continuous <Continuous>  dextrose 50% Injectable 12.5 Gram(s) IV Push once  dextrose 50% Injectable 25 Gram(s) IV Push once  dextrose 50% Injectable 25 Gram(s) IV Push once  heparin  Injectable 5000 Unit(s) SubCutaneous every 12 hours  influenza   Vaccine 0.5 milliLiter(s) IntraMuscular once  insulin lispro (HumaLOG) corrective regimen sliding scale   SubCutaneous three times a day before meals  insulin lispro (HumaLOG) corrective regimen sliding scale   SubCutaneous at bedtime  LORazepam     Tablet 0.5 milliGRAM(s) Oral once  metoprolol     tartrate 25 milliGRAM(s) Oral two times a day    MEDICATIONS  (PRN):  dextrose Gel 1 Dose(s) Oral once PRN Blood Glucose LESS THAN 70 milliGRAM(s)/deciliter  glucagon  Injectable 1 milliGRAM(s) IntraMuscular once PRN Glucose LESS THAN 70 milligrams/deciliter Subjective: Interval History - No events overnight  Code stroke called this morning as patient noted to be more lethargic.    Objective:   Vital Signs Last 24 Hrs  T(C): 36.7 (03 Jan 2018 05:51), Max: 36.8 (02 Jan 2018 22:48)  T(F): 98.1 (03 Jan 2018 05:51), Max: 98.3 (02 Jan 2018 22:48)  HR: 90 (03 Jan 2018 05:51) (90 - 105)  BP: 150/93 (03 Jan 2018 05:51) (141/92 - 153/112)  RR: 18 (03 Jan 2018 05:51) (18 - 19)  SpO2: 99% (03 Jan 2018 05:51) (99% - 100%)    General Exam:   General appearance: No acute distress                Neurological Exam:  Mental Status: Awake and alert with eyes closed primarily, but does open spontaneously. Severe dysarthria, follows simple midline and appendicular commands hesitantly     Cranial Nerves: EOMI, blinks to threat bilaterally. No facial asymmetry. Tongue midline    Motor: Minimally effort against gravity in all extremities, both falling to the bed in 1-2 seconds    Tone: normal          Dysmetria: Not following commands.    Sensation: intact to light touch, no extinction    Other:  01-02    144  |  104  |  51<H>  ----------------------------<  186<H>  4.9   |  17<L>  |  1.40<H>    Ca    9.5      02 Jan 2018 07:40  Mg     2.4     01-02    TPro  6.9  /  Alb  4.0  /  TBili  2.8<H>  /  DBili  1.2<H>  /  AST  1770<H>  /  ALT  1249<H>  /  AlkPhos  55  01-02    LIVER FUNCTIONS - ( 02 Jan 2018 07:40 )  Alb: 4.0 g/dL / Pro: 6.9 g/dL / ALK PHOS: 55 u/L / ALT: 1249 u/L / AST: 1770 u/L / GGT: x                       16.3   14.36 )-----------( 196      ( 02 Jan 2018 07:40 )             50.6     MEDICATIONS  (STANDING):  aspirin  chewable 81 milliGRAM(s) Oral daily  dextrose 5%. 1000 milliLiter(s) (50 mL/Hr) IV Continuous <Continuous>  dextrose 50% Injectable 12.5 Gram(s) IV Push once  dextrose 50% Injectable 25 Gram(s) IV Push once  dextrose 50% Injectable 25 Gram(s) IV Push once  heparin  Injectable 5000 Unit(s) SubCutaneous every 12 hours  influenza   Vaccine 0.5 milliLiter(s) IntraMuscular once  insulin lispro (HumaLOG) corrective regimen sliding scale   SubCutaneous three times a day before meals  insulin lispro (HumaLOG) corrective regimen sliding scale   SubCutaneous at bedtime  LORazepam     Tablet 0.5 milliGRAM(s) Oral once  metoprolol     tartrate 25 milliGRAM(s) Oral two times a day    MEDICATIONS  (PRN):  dextrose Gel 1 Dose(s) Oral once PRN Blood Glucose LESS THAN 70 milliGRAM(s)/deciliter  glucagon  Injectable 1 milliGRAM(s) IntraMuscular once PRN Glucose LESS THAN 70 milligrams/deciliter Subjective: Interval History - No events overnight  Code stroke called this morning as patient noted to be more lethargic.    Objective:   Vital Signs Last 24 Hrs  T(C): 36.7 (03 Jan 2018 05:51), Max: 36.8 (02 Jan 2018 22:48)  T(F): 98.1 (03 Jan 2018 05:51), Max: 98.3 (02 Jan 2018 22:48)  HR: 90 (03 Jan 2018 05:51) (90 - 105)  BP: 150/93 (03 Jan 2018 05:51) (141/92 - 153/112)  RR: 18 (03 Jan 2018 05:51) (18 - 19)  SpO2: 99% (03 Jan 2018 05:51) (99% - 100%)    General Exam:   General appearance: No acute distress                Neurological Exam:  Mental Status: Awake and alert with eyes closed primarily, but does open spontaneously. Severe dysarthria, although with overall decreased verbal output; follows simple midline and appendicular commands hesitantly     Cranial Nerves: EOMI, blinks to threat bilaterally. No facial asymmetry. Tongue midline    Motor: Minimally effort against gravity in all extremities, both falling to the bed in 1-2 seconds    Tone: normal          Dysmetria: Not following commands.    Sensation: intact to noxious stimuli, no extinction    Other:  01-02    144  |  104  |  51<H>  ----------------------------<  186<H>  4.9   |  17<L>  |  1.40<H>    Ca    9.5      02 Jan 2018 07:40  Mg     2.4     01-02    TPro  6.9  /  Alb  4.0  /  TBili  2.8<H>  /  DBili  1.2<H>  /  AST  1770<H>  /  ALT  1249<H>  /  AlkPhos  55  01-02    LIVER FUNCTIONS - ( 02 Jan 2018 07:40 )  Alb: 4.0 g/dL / Pro: 6.9 g/dL / ALK PHOS: 55 u/L / ALT: 1249 u/L / AST: 1770 u/L / GGT: x                       16.3   14.36 )-----------( 196      ( 02 Jan 2018 07:40 )             50.6     MEDICATIONS  (STANDING):  aspirin  chewable 81 milliGRAM(s) Oral daily  dextrose 5%. 1000 milliLiter(s) (50 mL/Hr) IV Continuous <Continuous>  dextrose 50% Injectable 12.5 Gram(s) IV Push once  dextrose 50% Injectable 25 Gram(s) IV Push once  dextrose 50% Injectable 25 Gram(s) IV Push once  heparin  Injectable 5000 Unit(s) SubCutaneous every 12 hours  influenza   Vaccine 0.5 milliLiter(s) IntraMuscular once  insulin lispro (HumaLOG) corrective regimen sliding scale   SubCutaneous three times a day before meals  insulin lispro (HumaLOG) corrective regimen sliding scale   SubCutaneous at bedtime  LORazepam     Tablet 0.5 milliGRAM(s) Oral once  metoprolol     tartrate 25 milliGRAM(s) Oral two times a day    MEDICATIONS  (PRN):  dextrose Gel 1 Dose(s) Oral once PRN Blood Glucose LESS THAN 70 milliGRAM(s)/deciliter  glucagon  Injectable 1 milliGRAM(s) IntraMuscular once PRN Glucose LESS THAN 70 milligrams/deciliter

## 2018-01-03 NOTE — PROGRESS NOTE ADULT - PROBLEM SELECTOR PLAN 6
Abd exam without tenderness. pt denies pain  transaminitis stable; if up-trending in am will obtain RUQ sono
-HjO5k=2.6%. FS well controlled. Continue with ISS for now and continue to monitor FS closely.   -Consistent carbohydrate diet
-QlG3z=3.6%. FS well controlled. Continue with ISS for now and continue to monitor FS closely.   -Consistent carbohydrate diet

## 2018-01-03 NOTE — CONSULT NOTE ADULT - SUBJECTIVE AND OBJECTIVE BOX
Chief Complaint:  Patient is a 77y old  Male who presents with a chief complaint of dizziness/falls (02 Jan 2018 10:26)      HPI: 78 yo man admitted with     Allergies:  No Known Allergies      Home Medications:    Hospital Medications:  aspirin  chewable 81 milliGRAM(s) Oral daily  glucagon  Injectable 1 milliGRAM(s) IntraMuscular once PRN  influenza   Vaccine 0.5 milliLiter(s) IntraMuscular once  LORazepam     Tablet 0.5 milliGRAM(s) Oral once  metoprolol     tartrate 25 milliGRAM(s) Oral two times a day      PMHX/PSHX:  CVA (cerebral vascular accident)  DM (diabetes mellitus)  HTN (hypertension)  No significant past surgical history      Family history:  No pertinent family history in first degree relatives      Social History:     ROS:     General:  No wt loss, fevers, chills, night sweats, fatigue   Eyes:  Good vision, no reported pain  ENT:  No sore throat, pain, runny nose  CV:  No chest pain, SOB, palpitations, orthopnea  Resp:  No cough, SOB, wheezing  GI:  See HPI  :  No pain, bleeding, incontinence, nocturia  Muscle:  No pain, weakness  Neuro:  No weakness, tingling, memory problems  Psych:  No fatigue, insomnia, mood problems, depression  Endocrine:  No cold/heat intolerance  Heme:  No petechiae, ecchymosis, easy bruising  Skin:  No rash, edema      PHYSICAL EXAM:     GENERAL: NAD  HEENT: sclera anicteric  CHEST: CTAB  HEART:  RRR, no MRG, no edema  ABDOMEN:  Soft, non-tender, non-distended, no masses, no hepatosplenomegaly  EXTREMITIES:  no cyanosis, or edema  SKIN:  No rash  NEURO:  Alert, orientedx3     Vital Signs:  Vital Signs Last 24 Hrs  T(C): 36.7 (03 Jan 2018 05:51), Max: 36.8 (02 Jan 2018 22:48)  T(F): 98.1 (03 Jan 2018 05:51), Max: 98.3 (02 Jan 2018 22:48)  HR: 78 (03 Jan 2018 09:15) (78 - 105)  BP: 150/104 (03 Jan 2018 09:15) (141/92 - 153/112)  BP(mean): --  RR: 18 (03 Jan 2018 05:51) (18 - 19)  SpO2: 99% (03 Jan 2018 05:51) (99% - 100%)  Daily     Daily     LABS:                        16.3   16.11 )-----------( 129      ( 03 Jan 2018 09:32 )             50.6     01-03    148<H>  |  109<H>  |  76<H>  ----------------------------<  185<H>  4.7   |  17<L>  |  1.82<H>    Ca    9.6      03 Jan 2018 09:32  Mg     2.5     01-03    TPro  6.7  /  Alb  3.8  /  TBili  4.1<H>  /  DBili  x   /  AST  2629<H>  /  ALT  2027<H>  /  AlkPhos  60  01-03    LIVER FUNCTIONS - ( 03 Jan 2018 06:25 )  Alb: 3.8 g/dL / Pro: 6.7 g/dL / ALK PHOS: 60 u/L / ALT: 2027 u/L / AST: 2629 u/L / GGT: x           PT/INR - ( 03 Jan 2018 09:32 )   PT: 31.7 SEC;   INR: 2.80          PTT - ( 03 Jan 2018 09:32 )  PTT:34.0 SEC    Imaging:  < from: MR Head No Cont (01.02.18 @ 04:27) >  IMPRESSION:    BRAIN MRI: An acute infarct is again noted involving the left cerebellum   as described, with new areas of patchy internal hemorrhagic   transformation.    BRAIN MRA:  Intracranial stenoses are noted as described. High-grade   stenosis versus occlusion likely involves the distal left superior   cerebellar artery. The study is substantially limited by motion.    < end of copied text > Chief Complaint:  Patient is a 77y old  Male who presents with a chief complaint of dizziness/falls (02 Jan 2018 10:26)      HPI: 76 yo man admitted on 12/31/17 for slurred speech that began on 12/28/17. Per his family, the patient developed slurred, incoherent speech on 12/28 but he refused to come to the hospital until 12/31. Otherwise he seemed at his baseline. His family denies fever, chills, muscle aches, nausea, vomiting, jaundice, or icterus.    In the ER he was found to have cerebellar infarct and MRI/MRA confirmed that he had acute cerebellar stroke.    Upon admission, he had elevated aminotransferases with  and . By today, his aminotransferases have increased to AST 2629 and ALT 2027. Furthermore, his PT is prolonged to 31.7 sec, corresponding to an INR of 2.8 and his total bilirubin which was 1.5 on admission has increased to 4.1. At this time he is also showing signs of waxing and waning altered/obtunded mental status.     Patient's family denies that he was taking any Tylenol at home prior to coming to the hospital and they deny that he was taking any OTC supplements or herbs.     His family denies any recent travel outside the [place].     After review of the EMR, there are no documented episodes of hypotension.    Allergies:  No Known Allergies      Home Medications:    Hospital Medications:  aspirin  chewable 81 milliGRAM(s) Oral daily  glucagon  Injectable 1 milliGRAM(s) IntraMuscular once PRN  influenza   Vaccine 0.5 milliLiter(s) IntraMuscular once  LORazepam     Tablet 0.5 milliGRAM(s) Oral once  metoprolol     tartrate 25 milliGRAM(s) Oral two times a day      PMHX/PSHX:  CVA (cerebral vascular accident)  DM (diabetes mellitus)  HTN (hypertension)  No significant past surgical history      Family history:  No pertinent family history in first degree relatives      Social History:     ROS:     Unable to provide      PHYSICAL EXAM:     GENERAL: NAD, somnolent MS  HEENT: sclera anicteric  CHEST: CTAB  HEART:  RRR, no MRG, no edema  ABDOMEN:  Soft, tender to palpation in RUQ, non-distended, no masses, palpable liver margin   EXTREMITIES:  no cyanosis, or edema  SKIN:  No rash  NEURO:  somnolent, does not follow commands    Vital Signs:  Vital Signs Last 24 Hrs  T(C): 36.7 (03 Jan 2018 05:51), Max: 36.8 (02 Jan 2018 22:48)  T(F): 98.1 (03 Jan 2018 05:51), Max: 98.3 (02 Jan 2018 22:48)  HR: 78 (03 Jan 2018 09:15) (78 - 105)  BP: 150/104 (03 Jan 2018 09:15) (141/92 - 153/112)  BP(mean): --  RR: 18 (03 Jan 2018 05:51) (18 - 19)  SpO2: 99% (03 Jan 2018 05:51) (99% - 100%)  Daily     LABS:                        16.3   16.11 )-----------( 129      ( 03 Jan 2018 09:32 )             50.6     01-03    148<H>  |  109<H>  |  76<H>  ----------------------------<  185<H>  4.7   |  17<L>  |  1.82<H>    Ca    9.6      03 Jan 2018 09:32  Mg     2.5     01-03    TPro  6.7  /  Alb  3.8  /  TBili  4.1<H>  /  DBili  x   /  AST  2629<H>  /  ALT  2027<H>  /  AlkPhos  60  01-03    LIVER FUNCTIONS - ( 03 Jan 2018 06:25 )  Alb: 3.8 g/dL / Pro: 6.7 g/dL / ALK PHOS: 60 u/L / ALT: 2027 u/L / AST: 2629 u/L / GGT: x           PT/INR - ( 03 Jan 2018 09:32 )   PT: 31.7 SEC;   INR: 2.80          PTT - ( 03 Jan 2018 09:32 )  PTT:34.0 SEC    Imaging:  < from: MR Head No Cont (01.02.18 @ 04:27) >  IMPRESSION:    BRAIN MRI: An acute infarct is again noted involving the left cerebellum   as described, with new areas of patchy internal hemorrhagic   transformation.    BRAIN MRA:  Intracranial stenoses are noted as described. High-grade   stenosis versus occlusion likely involves the distal left superior   cerebellar artery. The study is substantially limited by motion.    < end of copied text >

## 2018-01-03 NOTE — PROGRESS NOTE ADULT - PROBLEM SELECTOR PLAN 5
-likely reactive. no overt signs of infection. monitor for Aspiration. If febrile, consider starting emipiric Abx with Unasyn for possible aspiration (pt has been eating despite trouble swallowing)  -f/u CBC with differential from today
SBP in 140-150s  Continue Enalapril and Metoprolol
SBP in 140-150s  On  Metoprolol

## 2018-01-03 NOTE — CONSULT NOTE ADULT - ATTENDING COMMENTS
Mr. Jenkins is a 77M with hx of HTN, DM, HLD p/w slurred speech, ataxia, dizziness and falls since Thursday. Head CT showed a large left cerebellar stroke. His examination is c/w that diagnosis, with ataxia on the left, dysarthria and nystagmus. Agree with recommendations as stated above.
Pt. seen on 1/4/18. The case was discussed with me on the phone on 1/3/18.

## 2018-01-03 NOTE — CHART NOTE - NSCHARTNOTEFT_GEN_A_CORE
CHIEF COMPLAINT:    HPI:    PAST MEDICAL & SURGICAL HISTORY:  CVA (cerebral vascular accident)  DM (diabetes mellitus)  HTN (hypertension)  No significant past surgical history      FAMILY HISTORY:  No pertinent family history in first degree relatives      SOCIAL HISTORY:  Smoking: [ ] Never Smoked [ ] Former Smoker (__ packs x ___ years) [ ] Current Smoker  (__ packs x ___ years)  Substance Use: [ ] Never Used [ ] Used ____  EtOH Use:  Marital Status: [ ] Single [ ]  [ ]  [ ]   Sexual History:   Occupation:  Recent Travel:  Country of Birth:  Advance Directives:    Allergies    No Known Allergies    Intolerances        HOME MEDICATIONS:    REVIEW OF SYSTEMS:  Constitutional: [ ] fevers [ ] chills [ ] weight loss [ ] weight gain  HEENT: [ ] dry eyes [ ] eye irritation [ ] postnasal drip [ ] nasal congestion  CV: [ ] chest pain [ ] orthopnea [ ] palpitations [ ] murmur  Resp: [ ] cough [ ] shortness of breath [ ] dyspnea [ ] wheezing [ ] sputum [ ] hemoptysis  GI: [ ] nausea [ ] vomiting [ ] diarrhea [ ] constipation [ ] abd pain [ ] dysphagia   : [ ] dysuria [ ] nocturia [ ] hematuria [ ] increased urinary frequency  Musculoskeletal: [ ] back pain [ ] myalgias [ ] arthralgias [ ] fracture  Skin: [ ] rash [ ] itch  Neurological: [ ] headache [ ] dizziness [ ] syncope [ ] weakness [ ] numbness  Psychiatric: [ ] anxiety [ ] depression  Endocrine: [ ] diabetes [ ] thyroid problem  Hematologic/Lymphatic: [ ] anemia [ ] bleeding problem  Allergic/Immunologic: [ ] itchy eyes [ ] nasal discharge [ ] hives [ ] angioedema  [ ] All other systems negative  [ ] Unable to assess ROS because ________    OBJECTIVE:  ICU Vital Signs Last 24 Hrs  T(C): 37.1 (03 Jan 2018 15:41), Max: 37.1 (03 Jan 2018 15:41)  T(F): 98.7 (03 Jan 2018 15:41), Max: 98.7 (03 Jan 2018 15:41)  HR: 98 (03 Jan 2018 18:00) (78 - 98)  BP: 141/103 (03 Jan 2018 18:00) (141/92 - 152/112)  BP(mean): 111 (03 Jan 2018 18:00) (109 - 112)  ABP: --  ABP(mean): --  RR: 28 (03 Jan 2018 18:00) (18 - 28)  SpO2: 95% (03 Jan 2018 18:00) (94% - 99%)        01-03 @ 07:01  -  01-03 @ 18:16  --------------------------------------------------------  IN: 12.4 mL / OUT: 0 mL / NET: 12.4 mL      CAPILLARY BLOOD GLUCOSE  444 (03 Jan 2018 11:37)      POCT Blood Glucose.: 154 mg/dL (03 Jan 2018 17:22)      PHYSICAL EXAM:  General:   HEENT:   Lymph Nodes:  Neck:   Respiratory:   Cardiovascular:   Abdomen:   Extremities:   Skin:   Neurological:  Psychiatry:    LINES:     HOSPITAL MEDICATIONS:  MEDICATIONS  (STANDING):  dextrose 5%. 1000 milliLiter(s) (50 mL/Hr) IV Continuous <Continuous>  dextrose 50% Injectable 12.5 Gram(s) IV Push once  dextrose 50% Injectable 25 Gram(s) IV Push once  dextrose 50% Injectable 25 Gram(s) IV Push once  DOBUTamine Infusion 2.5 MICROgram(s)/kG/Min (6.15 mL/Hr) IV Continuous <Continuous>  influenza   Vaccine 0.5 milliLiter(s) IntraMuscular once  insulin lispro (HumaLOG) corrective regimen sliding scale   SubCutaneous three times a day before meals  insulin lispro (HumaLOG) corrective regimen sliding scale   SubCutaneous at bedtime  metoprolol     tartrate 25 milliGRAM(s) Oral two times a day  sodium chloride 0.9%. 1000 milliLiter(s) (50 mL/Hr) IV Continuous <Continuous>    MEDICATIONS  (PRN):  dextrose Gel 1 Dose(s) Oral once PRN Blood Glucose LESS THAN 70 milliGRAM(s)/deciliter  glucagon  Injectable 1 milliGRAM(s) IntraMuscular once PRN Glucose LESS THAN 70 milligrams/deciliter      LABS:                        16.8   16.46 )-----------( 126      ( 03 Jan 2018 16:00 )             51.5     Hgb Trend: 16.8<--, 16.3<--, 16.3<--, 15.6<--, 16.3<--  01-03    148<H>  |  111<H>  |  79<H>  ----------------------------<  187<H>  4.4   |  20<L>  |  1.77<H>    Ca    9.4      03 Jan 2018 16:00  Mg     2.5     01-03    TPro  6.5  /  Alb  3.7  /  TBili  4.0<H>  /  DBili  x   /  AST  2028<H>  /  ALT  1743<H>  /  AlkPhos  65  01-03    Creatinine Trend: 1.77<--, 1.82<--, 1.78<--, 1.40<--, 1.10<--, 0.99<--  PT/INR - ( 03 Jan 2018 16:00 )   PT: 31.4 SEC;   INR: 2.77          PTT - ( 03 Jan 2018 16:00 )  PTT:35.7 SEC      Venous Blood Gas:  01-03 @ 11:51  7.40/36/35/22/57.6  VBG Lactate: 4.8      MICROBIOLOGY:     RADIOLOGY:  [ ] Reviewed and interpreted by me    EKG: CHIEF COMPLAINT: AMS    HPI:   Patient is a 78 y/o M w/ a PMHx of T2DM, HTN, HLD, and remote CVA (2012) with no residual weakness who initially p/w acute onset of chest pain with SOB 4 days PTA which had resolved as well as dizziness and slurred speech x4 days and was found to have a L cerebellar acute to subacute infarct.      PAST MEDICAL & SURGICAL HISTORY:  CVA (cerebral vascular accident)  DM (diabetes mellitus)  HTN (hypertension)  No significant past surgical history      FAMILY HISTORY:  No pertinent family history in first degree relatives      SOCIAL HISTORY:  Smoking: [ ] Never Smoked [ ] Former Smoker (__ packs x ___ years) [ ] Current Smoker  (__ packs x ___ years)  Substance Use: [ ] Never Used [ ] Used ____  EtOH Use:  Marital Status: [ ] Single [ ]  [ ]  [ ]   Sexual History:   Occupation:  Recent Travel:  Country of Birth:  Advance Directives:    Allergies    No Known Allergies    Intolerances        HOME MEDICATIONS:    REVIEW OF SYSTEMS:  Constitutional: [ ] fevers [ ] chills [ ] weight loss [ ] weight gain  HEENT: [ ] dry eyes [ ] eye irritation [ ] postnasal drip [ ] nasal congestion  CV: [ ] chest pain [ ] orthopnea [ ] palpitations [ ] murmur  Resp: [ ] cough [ ] shortness of breath [ ] dyspnea [ ] wheezing [ ] sputum [ ] hemoptysis  GI: [ ] nausea [ ] vomiting [ ] diarrhea [ ] constipation [ ] abd pain [ ] dysphagia   : [ ] dysuria [ ] nocturia [ ] hematuria [ ] increased urinary frequency  Musculoskeletal: [ ] back pain [ ] myalgias [ ] arthralgias [ ] fracture  Skin: [ ] rash [ ] itch  Neurological: [ ] headache [ ] dizziness [ ] syncope [ ] weakness [ ] numbness  Psychiatric: [ ] anxiety [ ] depression  Endocrine: [ ] diabetes [ ] thyroid problem  Hematologic/Lymphatic: [ ] anemia [ ] bleeding problem  Allergic/Immunologic: [ ] itchy eyes [ ] nasal discharge [ ] hives [ ] angioedema  [ ] All other systems negative  [ ] Unable to assess ROS because ________    OBJECTIVE:  ICU Vital Signs Last 24 Hrs  T(C): 37.1 (03 Jan 2018 15:41), Max: 37.1 (03 Jan 2018 15:41)  T(F): 98.7 (03 Jan 2018 15:41), Max: 98.7 (03 Jan 2018 15:41)  HR: 98 (03 Jan 2018 18:00) (78 - 98)  BP: 141/103 (03 Jan 2018 18:00) (141/92 - 152/112)  BP(mean): 111 (03 Jan 2018 18:00) (109 - 112)  ABP: --  ABP(mean): --  RR: 28 (03 Jan 2018 18:00) (18 - 28)  SpO2: 95% (03 Jan 2018 18:00) (94% - 99%)        01-03 @ 07:01  -  01-03 @ 18:16  --------------------------------------------------------  IN: 12.4 mL / OUT: 0 mL / NET: 12.4 mL      CAPILLARY BLOOD GLUCOSE  444 (03 Jan 2018 11:37)      POCT Blood Glucose.: 154 mg/dL (03 Jan 2018 17:22)      PHYSICAL EXAM:  General:   HEENT:   Lymph Nodes:  Neck:   Respiratory:   Cardiovascular:   Abdomen:   Extremities:   Skin:   Neurological:  Psychiatry:    LINES:     HOSPITAL MEDICATIONS:  MEDICATIONS  (STANDING):  dextrose 5%. 1000 milliLiter(s) (50 mL/Hr) IV Continuous <Continuous>  dextrose 50% Injectable 12.5 Gram(s) IV Push once  dextrose 50% Injectable 25 Gram(s) IV Push once  dextrose 50% Injectable 25 Gram(s) IV Push once  DOBUTamine Infusion 2.5 MICROgram(s)/kG/Min (6.15 mL/Hr) IV Continuous <Continuous>  influenza   Vaccine 0.5 milliLiter(s) IntraMuscular once  insulin lispro (HumaLOG) corrective regimen sliding scale   SubCutaneous three times a day before meals  insulin lispro (HumaLOG) corrective regimen sliding scale   SubCutaneous at bedtime  metoprolol     tartrate 25 milliGRAM(s) Oral two times a day  sodium chloride 0.9%. 1000 milliLiter(s) (50 mL/Hr) IV Continuous <Continuous>    MEDICATIONS  (PRN):  dextrose Gel 1 Dose(s) Oral once PRN Blood Glucose LESS THAN 70 milliGRAM(s)/deciliter  glucagon  Injectable 1 milliGRAM(s) IntraMuscular once PRN Glucose LESS THAN 70 milligrams/deciliter      LABS:                        16.8   16.46 )-----------( 126      ( 03 Jan 2018 16:00 )             51.5     Hgb Trend: 16.8<--, 16.3<--, 16.3<--, 15.6<--, 16.3<--  01-03    148<H>  |  111<H>  |  79<H>  ----------------------------<  187<H>  4.4   |  20<L>  |  1.77<H>    Ca    9.4      03 Jan 2018 16:00  Mg     2.5     01-03    TPro  6.5  /  Alb  3.7  /  TBili  4.0<H>  /  DBili  x   /  AST  2028<H>  /  ALT  1743<H>  /  AlkPhos  65  01-03    Creatinine Trend: 1.77<--, 1.82<--, 1.78<--, 1.40<--, 1.10<--, 0.99<--  PT/INR - ( 03 Jan 2018 16:00 )   PT: 31.4 SEC;   INR: 2.77          PTT - ( 03 Jan 2018 16:00 )  PTT:35.7 SEC      Venous Blood Gas:  01-03 @ 11:51  7.40/36/35/22/57.6  VBG Lactate: 4.8      MICROBIOLOGY:     RADIOLOGY:  [ ] Reviewed and interpreted by me    EKG: CHIEF COMPLAINT: AMS    HPI:   Patient is a 78 y/o M w/ a PMHx of T2DM, HTN, HLD, and remote CVA (2012) with no residual weakness who initially p/w acute onset of chest pain/SOB for 4 days PTA which had resolved. He also p/w dizziness and slurred speech x4 days, and was found to have a L cerebellar acute infarct. Patient was initially admitted for CVA workup and to r/o ACS as patient was found to have elevated Lidia. Cardiology was following who had a low suspicion for ACS and suspected the + Lidia to be related to patient's CVA. Patient's hospital course has been complicated by acute liver failure of unclear etiology. Patient's LFTs significantly increased from 1/1-1/2 (AST/ALT = 89/110 --> 1770/1249). Patient's LFTs continued to increase this AM and he was found to have evidence of coagulopathy (INR: 1.36-->2.69)       PAST MEDICAL & SURGICAL HISTORY:  CVA (cerebral vascular accident)  DM (diabetes mellitus)  HTN (hypertension)  No significant past surgical history      FAMILY HISTORY:  No pertinent family history in first degree relatives      SOCIAL HISTORY:  Smoking: [ ] Never Smoked [ ] Former Smoker (__ packs x ___ years) [ ] Current Smoker  (__ packs x ___ years)  Substance Use: [ ] Never Used [ ] Used ____  EtOH Use:  Marital Status: [ ] Single [ ]  [ ]  [ ]   Sexual History:   Occupation:  Recent Travel:  Country of Birth:  Advance Directives:    Allergies    No Known Allergies    Intolerances        HOME MEDICATIONS:    REVIEW OF SYSTEMS:  Constitutional: [ ] fevers [ ] chills [ ] weight loss [ ] weight gain  HEENT: [ ] dry eyes [ ] eye irritation [ ] postnasal drip [ ] nasal congestion  CV: [ ] chest pain [ ] orthopnea [ ] palpitations [ ] murmur  Resp: [ ] cough [ ] shortness of breath [ ] dyspnea [ ] wheezing [ ] sputum [ ] hemoptysis  GI: [ ] nausea [ ] vomiting [ ] diarrhea [ ] constipation [ ] abd pain [ ] dysphagia   : [ ] dysuria [ ] nocturia [ ] hematuria [ ] increased urinary frequency  Musculoskeletal: [ ] back pain [ ] myalgias [ ] arthralgias [ ] fracture  Skin: [ ] rash [ ] itch  Neurological: [ ] headache [ ] dizziness [ ] syncope [ ] weakness [ ] numbness  Psychiatric: [ ] anxiety [ ] depression  Endocrine: [ ] diabetes [ ] thyroid problem  Hematologic/Lymphatic: [ ] anemia [ ] bleeding problem  Allergic/Immunologic: [ ] itchy eyes [ ] nasal discharge [ ] hives [ ] angioedema  [ ] All other systems negative  [ ] Unable to assess ROS because ________    OBJECTIVE:  ICU Vital Signs Last 24 Hrs  T(C): 37.1 (03 Jan 2018 15:41), Max: 37.1 (03 Jan 2018 15:41)  T(F): 98.7 (03 Jan 2018 15:41), Max: 98.7 (03 Jan 2018 15:41)  HR: 98 (03 Jan 2018 18:00) (78 - 98)  BP: 141/103 (03 Jan 2018 18:00) (141/92 - 152/112)  BP(mean): 111 (03 Jan 2018 18:00) (109 - 112)  ABP: --  ABP(mean): --  RR: 28 (03 Jan 2018 18:00) (18 - 28)  SpO2: 95% (03 Jan 2018 18:00) (94% - 99%)        01-03 @ 07:01  -  01-03 @ 18:16  --------------------------------------------------------  IN: 12.4 mL / OUT: 0 mL / NET: 12.4 mL      CAPILLARY BLOOD GLUCOSE  444 (03 Jan 2018 11:37)      POCT Blood Glucose.: 154 mg/dL (03 Jan 2018 17:22)      PHYSICAL EXAM:  General:   HEENT:   Lymph Nodes:  Neck:   Respiratory:   Cardiovascular:   Abdomen:   Extremities:   Skin:   Neurological:  Psychiatry:    LINES:     HOSPITAL MEDICATIONS:  MEDICATIONS  (STANDING):  dextrose 5%. 1000 milliLiter(s) (50 mL/Hr) IV Continuous <Continuous>  dextrose 50% Injectable 12.5 Gram(s) IV Push once  dextrose 50% Injectable 25 Gram(s) IV Push once  dextrose 50% Injectable 25 Gram(s) IV Push once  DOBUTamine Infusion 2.5 MICROgram(s)/kG/Min (6.15 mL/Hr) IV Continuous <Continuous>  influenza   Vaccine 0.5 milliLiter(s) IntraMuscular once  insulin lispro (HumaLOG) corrective regimen sliding scale   SubCutaneous three times a day before meals  insulin lispro (HumaLOG) corrective regimen sliding scale   SubCutaneous at bedtime  metoprolol     tartrate 25 milliGRAM(s) Oral two times a day  sodium chloride 0.9%. 1000 milliLiter(s) (50 mL/Hr) IV Continuous <Continuous>    MEDICATIONS  (PRN):  dextrose Gel 1 Dose(s) Oral once PRN Blood Glucose LESS THAN 70 milliGRAM(s)/deciliter  glucagon  Injectable 1 milliGRAM(s) IntraMuscular once PRN Glucose LESS THAN 70 milligrams/deciliter      LABS:                        16.8   16.46 )-----------( 126      ( 03 Jan 2018 16:00 )             51.5     Hgb Trend: 16.8<--, 16.3<--, 16.3<--, 15.6<--, 16.3<--  01-03    148<H>  |  111<H>  |  79<H>  ----------------------------<  187<H>  4.4   |  20<L>  |  1.77<H>    Ca    9.4      03 Jan 2018 16:00  Mg     2.5     01-03    TPro  6.5  /  Alb  3.7  /  TBili  4.0<H>  /  DBili  x   /  AST  2028<H>  /  ALT  1743<H>  /  AlkPhos  65  01-03    Creatinine Trend: 1.77<--, 1.82<--, 1.78<--, 1.40<--, 1.10<--, 0.99<--  PT/INR - ( 03 Jan 2018 16:00 )   PT: 31.4 SEC;   INR: 2.77          PTT - ( 03 Jan 2018 16:00 )  PTT:35.7 SEC      Venous Blood Gas:  01-03 @ 11:51  7.40/36/35/22/57.6  VBG Lactate: 4.8      MICROBIOLOGY:     RADIOLOGY:  [ ] Reviewed and interpreted by me    EKG: CHIEF COMPLAINT: AMS    HPI:   Patient is a 76 y/o M w/ a PMHx of T2DM, HTN, HLD, and remote CVA (2012) with no residual weakness who initially p/w acute onset of chest pain/SOB for 4 days PTA which had resolved. He also p/w dizziness and slurred speech x4 days, and was found to have a L cerebellar acute infarct. Patient was initially admitted for CVA workup and to r/o ACS as patient was found to have elevated Lidia. Cardiology was following who had a low suspicion for ACS and suspected the + Lidia to be related to patient's CVA. Patient's hospital course has been complicated by acute liver failure of unclear etiology. Patient's LFTs significantly increased from 1/1-1/2 (AST/ALT = 89/110 --> 1770/1249). Patient's LFTs continued to increase this AM and he was found to have evidence of coagulopathy (INR: 1.36-->2.69). Patient's mental status acutely worsened today and he was noted to be much more lethargic. Code stroke was called and a repeat head CT was grossly unchanged. Given worsening encephalopathy in the setting of acute liver failure, patient was transferred to the MICU for further management.     At time of visit, patient appeared very lethargic. He remained unresponsive to questions and he poorly followed commands, though he is currently protecting his airway. POCUS showed poor R heart function.      PAST MEDICAL & SURGICAL HISTORY:  CVA (cerebral vascular accident)  DM (diabetes mellitus)  HTN (hypertension)  No significant past surgical history      FAMILY HISTORY:  No pertinent family history in first degree relatives      SOCIAL HISTORY:  Smoking: [ ] Never Smoked [x] Former Smoker (Smoked 2ppd, quit 20 years ago) [ ] Current Smoker  (__ packs x ___ years)  Substance Use: [x] Never Used [ ] Used ____  EtOH Use: Former, Quit 20 years ago  Marital Status: [ ] Single [x]  [ ]  [ ]   Sexual History:   Occupation:  Recent Travel:  Country of Birth:  Advance Directives:    Allergies    No Known Allergies    Intolerances        HOME MEDICATIONS:    REVIEW OF SYSTEMS:  Constitutional: [ ] fevers [ ] chills [ ] weight loss [ ] weight gain  HEENT: [ ] dry eyes [ ] eye irritation [ ] postnasal drip [ ] nasal congestion  CV: [ ] chest pain [ ] orthopnea [ ] palpitations [ ] murmur  Resp: [ ] cough [ ] shortness of breath [ ] dyspnea [ ] wheezing [ ] sputum [ ] hemoptysis  GI: [ ] nausea [ ] vomiting [ ] diarrhea [ ] constipation [ ] abd pain [ ] dysphagia   : [ ] dysuria [ ] nocturia [ ] hematuria [ ] increased urinary frequency  Musculoskeletal: [ ] back pain [ ] myalgias [ ] arthralgias [ ] fracture  Skin: [ ] rash [ ] itch  Neurological: [ ] headache [ ] dizziness [ ] syncope [ ] weakness [ ] numbness  Psychiatric: [ ] anxiety [ ] depression  Endocrine: [ ] diabetes [ ] thyroid problem  Hematologic/Lymphatic: [ ] anemia [ ] bleeding problem  Allergic/Immunologic: [ ] itchy eyes [ ] nasal discharge [ ] hives [ ] angioedema  [ ] All other systems negative  [x] Unable to assess ROS because patient is lethargic and altered    OBJECTIVE:  ICU Vital Signs Last 24 Hrs  T(C): 37.1 (03 Jan 2018 15:41), Max: 37.1 (03 Jan 2018 15:41)  T(F): 98.7 (03 Jan 2018 15:41), Max: 98.7 (03 Jan 2018 15:41)  HR: 98 (03 Jan 2018 18:00) (78 - 98)  BP: 141/103 (03 Jan 2018 18:00) (141/92 - 152/112)  BP(mean): 111 (03 Jan 2018 18:00) (109 - 112)  ABP: --  ABP(mean): --  RR: 28 (03 Jan 2018 18:00) (18 - 28)  SpO2: 95% (03 Jan 2018 18:00) (94% - 99%)        01-03 @ 07:01  -  01-03 @ 18:16  --------------------------------------------------------  IN: 12.4 mL / OUT: 0 mL / NET: 12.4 mL      CAPILLARY BLOOD GLUCOSE  444 (03 Jan 2018 11:37)      POCT Blood Glucose.: 154 mg/dL (03 Jan 2018 17:22)      PHYSICAL EXAM:  General: Ill-appearing  HEENT: NC/AT  Neck: Supple   Respiratory: Grossly CTAB; No wheeze appreciated  Cardiovascular: RRR; +S1/S2  Abdomen: +BS, Soft, NT, No rigidity  Extremities: No peripheral edema  Skin: Warm and dry  Neurological: A+Ox0, No responses to questions, Not following commands  Psychiatry: Unable to assess    LINES: PIV     HOSPITAL MEDICATIONS:  MEDICATIONS  (STANDING):  dextrose 5%. 1000 milliLiter(s) (50 mL/Hr) IV Continuous <Continuous>  dextrose 50% Injectable 12.5 Gram(s) IV Push once  dextrose 50% Injectable 25 Gram(s) IV Push once  dextrose 50% Injectable 25 Gram(s) IV Push once  DOBUTamine Infusion 2.5 MICROgram(s)/kG/Min (6.15 mL/Hr) IV Continuous <Continuous>  influenza   Vaccine 0.5 milliLiter(s) IntraMuscular once  insulin lispro (HumaLOG) corrective regimen sliding scale   SubCutaneous three times a day before meals  insulin lispro (HumaLOG) corrective regimen sliding scale   SubCutaneous at bedtime  metoprolol     tartrate 25 milliGRAM(s) Oral two times a day  sodium chloride 0.9%. 1000 milliLiter(s) (50 mL/Hr) IV Continuous <Continuous>    MEDICATIONS  (PRN):  dextrose Gel 1 Dose(s) Oral once PRN Blood Glucose LESS THAN 70 milliGRAM(s)/deciliter  glucagon  Injectable 1 milliGRAM(s) IntraMuscular once PRN Glucose LESS THAN 70 milligrams/deciliter      LABS:                        16.8   16.46 )-----------( 126      ( 03 Jan 2018 16:00 )             51.5     Hgb Trend: 16.8<--, 16.3<--, 16.3<--, 15.6<--, 16.3<--  01-03    148<H>  |  111<H>  |  79<H>  ----------------------------<  187<H>  4.4   |  20<L>  |  1.77<H>    Ca    9.4      03 Jan 2018 16:00  Mg     2.5     01-03    TPro  6.5  /  Alb  3.7  /  TBili  4.0<H>  /  DBili  x   /  AST  2028<H>  /  ALT  1743<H>  /  AlkPhos  65  01-03    Creatinine Trend: 1.77<--, 1.82<--, 1.78<--, 1.40<--, 1.10<--, 0.99<--  PT/INR - ( 03 Jan 2018 16:00 )   PT: 31.4 SEC;   INR: 2.77          PTT - ( 03 Jan 2018 16:00 )  PTT:35.7 SEC      Venous Blood Gas:  01-03 @ 11:51  7.40/36/35/22/57.6  VBG Lactate: 4.8    RADIOLOGY:  Head CT (1/3): Previously noted acute left cerebellar infarct is again seen.    Abdominal U/S (1/3): Small gallbladder polyp. The hepatic and the portal veins are patent.    ASSESSMENT AND PLAN:  Patient is a 76 y/o M w/ a PMHx of T2DM, HTN, HLD, and remote CVA (2012) with no residual weakness who was initially admitted for ACS r/o and workup of an acute L cerebellar infarct with hospital course c/b encephalopathy likely 2/2 acute liver failure of unknown etiology. CHIEF COMPLAINT: AMS    HPI:   Patient is a 78 y/o M w/ a PMHx of T2DM, HTN, HLD, and remote CVA (2012) with no residual weakness who initially p/w acute onset of chest pain/SOB for 4 days PTA which had resolved. He also p/w dizziness and slurred speech x4 days, and was found to have a L cerebellar acute infarct. Patient was initially admitted for CVA workup and to r/o ACS as patient was found to have elevated Lidia. Cardiology was following who had a low suspicion for ACS and suspected the + Lidia to be related to patient's CVA. Patient's hospital course has been complicated by acute liver failure of unclear etiology. Patient's LFTs significantly increased from 1/1-1/2 (AST/ALT = 89/110 --> 1770/1249). Patient's LFTs continued to increase this AM and he was found to have evidence of coagulopathy (INR: 1.36-->2.69). Patient's mental status acutely worsened today and he was noted to be much more lethargic. Code stroke was called and a repeat head CT was grossly unchanged. Given worsening encephalopathy in the setting of acute liver failure, patient was transferred to the MICU for further management.     At time of visit, patient appeared very lethargic. He remained unresponsive to questions and he poorly followed commands, though he is currently protecting his airway. POCUS showed poor R heart function.      PAST MEDICAL & SURGICAL HISTORY:  CVA (cerebral vascular accident)  DM (diabetes mellitus)  HTN (hypertension)  No significant past surgical history      FAMILY HISTORY:  No pertinent family history in first degree relatives      SOCIAL HISTORY:  Smoking: [ ] Never Smoked [x] Former Smoker (Smoked 2ppd, quit 20 years ago) [ ] Current Smoker  (__ packs x ___ years)  Substance Use: [x] Never Used [ ] Used ____  EtOH Use: Former, Quit 20 years ago  Marital Status: [ ] Single [x]  [ ]  [ ]   Sexual History:   Occupation:  Recent Travel:  Country of Birth:  Advance Directives:    Allergies    No Known Allergies    Intolerances        HOME MEDICATIONS:    REVIEW OF SYSTEMS:  Constitutional: [ ] fevers [ ] chills [ ] weight loss [ ] weight gain  HEENT: [ ] dry eyes [ ] eye irritation [ ] postnasal drip [ ] nasal congestion  CV: [ ] chest pain [ ] orthopnea [ ] palpitations [ ] murmur  Resp: [ ] cough [ ] shortness of breath [ ] dyspnea [ ] wheezing [ ] sputum [ ] hemoptysis  GI: [ ] nausea [ ] vomiting [ ] diarrhea [ ] constipation [ ] abd pain [ ] dysphagia   : [ ] dysuria [ ] nocturia [ ] hematuria [ ] increased urinary frequency  Musculoskeletal: [ ] back pain [ ] myalgias [ ] arthralgias [ ] fracture  Skin: [ ] rash [ ] itch  Neurological: [ ] headache [ ] dizziness [ ] syncope [ ] weakness [ ] numbness  Psychiatric: [ ] anxiety [ ] depression  Endocrine: [ ] diabetes [ ] thyroid problem  Hematologic/Lymphatic: [ ] anemia [ ] bleeding problem  Allergic/Immunologic: [ ] itchy eyes [ ] nasal discharge [ ] hives [ ] angioedema  [ ] All other systems negative  [x] Unable to assess ROS because patient is lethargic and altered    OBJECTIVE:  ICU Vital Signs Last 24 Hrs  T(C): 37.1 (03 Jan 2018 15:41), Max: 37.1 (03 Jan 2018 15:41)  T(F): 98.7 (03 Jan 2018 15:41), Max: 98.7 (03 Jan 2018 15:41)  HR: 98 (03 Jan 2018 18:00) (78 - 98)  BP: 141/103 (03 Jan 2018 18:00) (141/92 - 152/112)  BP(mean): 111 (03 Jan 2018 18:00) (109 - 112)  ABP: --  ABP(mean): --  RR: 28 (03 Jan 2018 18:00) (18 - 28)  SpO2: 95% (03 Jan 2018 18:00) (94% - 99%)        01-03 @ 07:01  -  01-03 @ 18:16  --------------------------------------------------------  IN: 12.4 mL / OUT: 0 mL / NET: 12.4 mL      CAPILLARY BLOOD GLUCOSE  444 (03 Jan 2018 11:37)      POCT Blood Glucose.: 154 mg/dL (03 Jan 2018 17:22)      PHYSICAL EXAM:  General: Ill-appearing  HEENT: NC/AT  Neck: Supple   Respiratory: Grossly CTAB; No wheeze appreciated  Cardiovascular: RRR; +S1/S2  Abdomen: +BS, Soft, NT, No rigidity  Extremities: No peripheral edema  Skin: Warm and dry  Neurological: A+Ox0, No responses to questions, Not following commands  Psychiatry: Unable to assess    LINES: PIV     HOSPITAL MEDICATIONS:  MEDICATIONS  (STANDING):  dextrose 5%. 1000 milliLiter(s) (50 mL/Hr) IV Continuous <Continuous>  dextrose 50% Injectable 12.5 Gram(s) IV Push once  dextrose 50% Injectable 25 Gram(s) IV Push once  dextrose 50% Injectable 25 Gram(s) IV Push once  DOBUTamine Infusion 2.5 MICROgram(s)/kG/Min (6.15 mL/Hr) IV Continuous <Continuous>  influenza   Vaccine 0.5 milliLiter(s) IntraMuscular once  insulin lispro (HumaLOG) corrective regimen sliding scale   SubCutaneous three times a day before meals  insulin lispro (HumaLOG) corrective regimen sliding scale   SubCutaneous at bedtime  metoprolol     tartrate 25 milliGRAM(s) Oral two times a day  sodium chloride 0.9%. 1000 milliLiter(s) (50 mL/Hr) IV Continuous <Continuous>    MEDICATIONS  (PRN):  dextrose Gel 1 Dose(s) Oral once PRN Blood Glucose LESS THAN 70 milliGRAM(s)/deciliter  glucagon  Injectable 1 milliGRAM(s) IntraMuscular once PRN Glucose LESS THAN 70 milligrams/deciliter      LABS:                        16.8   16.46 )-----------( 126      ( 03 Jan 2018 16:00 )             51.5     Hgb Trend: 16.8<--, 16.3<--, 16.3<--, 15.6<--, 16.3<--  01-03    148<H>  |  111<H>  |  79<H>  ----------------------------<  187<H>  4.4   |  20<L>  |  1.77<H>    Ca    9.4      03 Jan 2018 16:00  Mg     2.5     01-03    TPro  6.5  /  Alb  3.7  /  TBili  4.0<H>  /  DBili  x   /  AST  2028<H>  /  ALT  1743<H>  /  AlkPhos  65  01-03    Creatinine Trend: 1.77<--, 1.82<--, 1.78<--, 1.40<--, 1.10<--, 0.99<--  PT/INR - ( 03 Jan 2018 16:00 )   PT: 31.4 SEC;   INR: 2.77          PTT - ( 03 Jan 2018 16:00 )  PTT:35.7 SEC      Venous Blood Gas:  01-03 @ 11:51  7.40/36/35/22/57.6  VBG Lactate: 4.8    RADIOLOGY:  Head CT (1/3): Previously noted acute left cerebellar infarct is again seen.    Abdominal U/S (1/3): Small gallbladder polyp. The hepatic and the portal veins are patent.    ASSESSMENT AND PLAN:  Patient is a 78 y/o M w/ a PMHx of T2DM, HTN, HLD, and remote CVA (2012) with no residual weakness who was initially admitted for ACS r/o and workup of an acute L cerebellar infarct with hospital course c/b encephalopathy likely 2/2 acute liver failure of unknown etiology.    # Neuro  - Patient found to have an acute L cerebellar infarct this admission. Of note, Brain MRI also showed new areas of patchy internal hemorrhagic   transformation. Patient was acutely encephalopathic this AM. Code stroke called, Head CT was unchanged from prior. Encephalopathy is likely 2/2 acute liver failure given worsening transaminitis and coagulopathy. Ammonia was checked and was WNL. Will closely monitor for improvement in mental status.    # Pulm  - Patient is currently saturating well on 2L NC. If encephalopathy worsens, will evaluate need for intubation for airway protection    # CV  - Patient has remained HDS off pressors. He was found to have + Lidia on admission which was suspected to be related to patient's acute CVA per Cardiology. On POCUS this afternoon, patient's R heart function appears to be poor. Concern that patient may have poor forward flow resulting in ? congestive hepatopathy leading to his significant transaminitis. Will start patient on a Dobutamine gtt for now. Patient w/ elevated Troponin this afternoon (0.68) this afternoon in the setting of TATO. CK/CK-MB negative. EKG/R-sided EKG with no apparent acute ST changes. Of note, pro-BNP was found [place] be [id number] on 1/2. Will monitor Lidia Q6H for now.    # GI  - Patient with acute liver failure possibly 2/2 congestive hepatopathy as noted above. Patient has noted been acute hypotensive this hospitalization. No new concerning medications. Hepatitis panel currently negative. Abdominal U/S with no concerning findings (hepatic vein was patent). Tox screen was negative.   - GI following, appreciate recs. Will c/w autoimmune/infectious workup. Will monitor LFTs and coags Q6H for now.    # Renal  - Patient with TATO (Cr = 1.77 this afternoon) possibly 2/2 cardiorenal due to poor flow? Patient started on a Dobutamine gtt.   - Strict Is/Os. Monitor BMP    # ID  - Patient with a leukocytosis which has grossly remained stable. He is currently afebrile. He is without signs or symptoms of infection. Acute hepatitis panel currently negative. Will monitor off antibiotics.  - Monitor CBC and fever curve    # Endo  - Patient with history of T2DM. Hold home oral diabetes medications  - C/w HISS. Monitor FS    # DVT PPx  - Given new areas of patchy internal hemorrhagic transformation on brain MRI, will hold off pharmacologic AC for now. Will start SCDs.

## 2018-01-03 NOTE — CONSULT NOTE ADULT - SUBJECTIVE AND OBJECTIVE BOX
HPI:  77m with hx DM, HTN, HLD, remote CVA with no residual a/w multiple episodes of dizziness and falls for past 4 days along with slurred speech. Daughter and wife at bedside also report pt experienced trouble swallowing and "not acting like himself." Daughter reports similar episode in 2013 , states pt experienced a stroke and MI at the same time.   MRI  with an acute infarct is again noted involving the left cerebellum as described, with new areas of patchy internal hemorrhagic transformation.   MRA head/neck-  High-grade stenosis versus occlusion likely involves the distal left superior cerebellar artery      Pt with Acute liver failure,  encephalopathy- work-up in progress. TATO- hepatrenal.     Pt with acute  change in mental status  this AM. Not following commands, non verbal  Code Stroke called , Repeat CT head- no acute infarct     REVIEW OF SYSTEMS: No chest pain, shortness of breath, nausea, vomiting or diarhea.      PAST MEDICAL & SURGICAL HISTORY  CVA (cerebral vascular accident)  DM (diabetes mellitus)  HTN (hypertension)  No significant past surgical history      SOCIAL HISTORY  Smoking - Denied, EtOH - Denied, Drugs - Denied    FUNCTIONAL HISTORY:   Lives   Independent    CURRENT FUNCTIONAL STATUS: mod a       FAMILY HISTORY   No pertinent family history in first degree relatives      RECENT LABS/IMAGING  CBC Full  -  ( 03 Jan 2018 09:32 )  WBC Count : 16.11 K/uL  Hemoglobin : 16.3 g/dL  Hematocrit : 50.6 %  Platelet Count - Automated : 129 K/uL  Mean Cell Volume : 92.3 fL  Mean Cell Hemoglobin : 29.7 pg  Mean Cell Hemoglobin Concentration : 32.2 %  Auto Neutrophil # : x  Auto Lymphocyte # : x  Auto Monocyte # : x  Auto Eosinophil # : x  Auto Basophil # : x  Auto Neutrophil % : x  Auto Lymphocyte % : x  Auto Monocyte % : x  Auto Eosinophil % : x  Auto Basophil % : x    01-03    148<H>  |  109<H>  |  76<H>  ----------------------------<  185<H>  4.7   |  17<L>  |  1.82<H>    Ca    9.6      03 Jan 2018 09:32  Mg     2.5     01-03    TPro  6.7  /  Alb  3.8  /  TBili  4.1<H>  /  DBili  x   /  AST  2629<H>  /  ALT  2027<H>  /  AlkPhos  60  01-03        VITALS  T(C): 36.4 (01-03-18 @ 14:48), Max: 36.8 (01-02-18 @ 22:48)  HR: 88 (01-03-18 @ 14:48) (78 - 94)  BP: 152/112 (01-03-18 @ 14:48) (141/92 - 153/112)  RR: 19 (01-03-18 @ 14:48) (18 - 19)  SpO2: 97% (01-03-18 @ 14:48) (96% - 99%)  Wt(kg): --    ALLERGIES  No Known Allergies      MEDICATIONS   dextrose 5%. 1000 milliLiter(s) IV Continuous <Continuous>  dextrose 50% Injectable 12.5 Gram(s) IV Push once  dextrose 50% Injectable 25 Gram(s) IV Push once  dextrose 50% Injectable 25 Gram(s) IV Push once  dextrose Gel 1 Dose(s) Oral once PRN  glucagon  Injectable 1 milliGRAM(s) IntraMuscular once PRN  influenza   Vaccine 0.5 milliLiter(s) IntraMuscular once  insulin lispro (HumaLOG) corrective regimen sliding scale   SubCutaneous three times a day before meals  insulin lispro (HumaLOG) corrective regimen sliding scale   SubCutaneous at bedtime  LORazepam     Tablet 0.5 milliGRAM(s) Oral once  metoprolol     tartrate 25 milliGRAM(s) Oral two times a day  sodium chloride 0.9%. 1000 milliLiter(s) IV Continuous <Continuous>      ----------------------------------------------------------------------------------------  PHYSICAL EXAM  Constitutional - NAD, Comfortable  HEENT - NCAT, EOMI  Neck - Supple, No limited ROM  Chest - CTA bilaterally, No wheeze, No rhonchi, No crackles  Cardiovascular - RRR, S1S2, No murmurs  Abdomen - BS+, Soft, NTND  Extremities - No C/C/E, No calf tenderness   Neurologic Exam -                    Cognitive - Awake, Alert, AAO to self, place, date, year, situation     Communication - Fluent, No dysarthria, no aphasia     Cranial Nerves - CN 2-12 intact     Motor - No focal deficits                       Sensory - Intact to LT     Reflexes - DTR Intact, No primitive reflexive     Balance - WNL Static  Psychiatric - Mood stable, Affect WNL HPI:  77m with hx DM, HTN, HLD, remote CVA with no residual a/w multiple episodes of dizziness and falls for past 4 days along with slurred speech. Daughter and wife at bedside also report pt experienced trouble swallowing and "not acting like himself." Daughter reports similar episode in 2013 , states pt experienced a stroke and MI at the same time.   MRI  with an acute infarct is again noted involving the left cerebellum as described, with new areas of patchy internal hemorrhagic transformation.   MRA head/neck-  High-grade stenosis versus occlusion likely involves the distal left superior cerebellar artery      Pt with Acute liver failure,  encephalopathy- work-up in progress. TATO- hepatrenal.     Pt with acute  change in mental status  this AM. Not following commands, non verbal  Code Stroke called , Repeat CT head- no acute infarct       REVIEW OF SYSTEMS: unable to obtain.     PAST MEDICAL & SURGICAL HISTORY  CVA (cerebral vascular accident)  DM (diabetes mellitus)  HTN (hypertension)  No significant past surgical history      SOCIAL HISTORY  Smoking - Denied, EtOH - Denied, Drugs - Denied    FUNCTIONAL HISTORY:   Lives with family   Independent PTA     CURRENT FUNCTIONAL STATUS:max  a       FAMILY HISTORY   No pertinent family history in first degree relatives      RECENT LABS/IMAGING  CBC Full  -  ( 03 Jan 2018 09:32 )  WBC Count : 16.11 K/uL  Hemoglobin : 16.3 g/dL  Hematocrit : 50.6 %  Platelet Count - Automated : 129 K/uL  Mean Cell Volume : 92.3 fL  Mean Cell Hemoglobin : 29.7 pg  Mean Cell Hemoglobin Concentration : 32.2 %  Auto Neutrophil # : x  Auto Lymphocyte # : x  Auto Monocyte # : x  Auto Eosinophil # : x  Auto Basophil # : x  Auto Neutrophil % : x  Auto Lymphocyte % : x  Auto Monocyte % : x  Auto Eosinophil % : x  Auto Basophil % : x    01-03    148<H>  |  109<H>  |  76<H>  ----------------------------<  185<H>  4.7   |  17<L>  |  1.82<H>    Ca    9.6      03 Jan 2018 09:32  Mg     2.5     01-03    TPro  6.7  /  Alb  3.8  /  TBili  4.1<H>  /  DBili  x   /  AST  2629<H>  /  ALT  2027<H>  /  AlkPhos  60  01-03        VITALS  T(C): 36.4 (01-03-18 @ 14:48), Max: 36.8 (01-02-18 @ 22:48)  HR: 88 (01-03-18 @ 14:48) (78 - 94)  BP: 152/112 (01-03-18 @ 14:48) (141/92 - 153/112)  RR: 19 (01-03-18 @ 14:48) (18 - 19)  SpO2: 97% (01-03-18 @ 14:48) (96% - 99%)  Wt(kg): --    ALLERGIES  No Known Allergies      MEDICATIONS   dextrose 5%. 1000 milliLiter(s) IV Continuous <Continuous>  dextrose 50% Injectable 12.5 Gram(s) IV Push once  dextrose 50% Injectable 25 Gram(s) IV Push once  dextrose 50% Injectable 25 Gram(s) IV Push once  dextrose Gel 1 Dose(s) Oral once PRN  glucagon  Injectable 1 milliGRAM(s) IntraMuscular once PRN  influenza   Vaccine 0.5 milliLiter(s) IntraMuscular once  insulin lispro (HumaLOG) corrective regimen sliding scale   SubCutaneous three times a day before meals  insulin lispro (HumaLOG) corrective regimen sliding scale   SubCutaneous at bedtime  LORazepam     Tablet 0.5 milliGRAM(s) Oral once  metoprolol     tartrate 25 milliGRAM(s) Oral two times a day  sodium chloride 0.9%. 1000 milliLiter(s) IV Continuous <Continuous>      ----------------------------------------------------------------------------------------  PHYSICAL EXAM  Constitutional -lethargic   S1/S2  Abd slightly distended   HEENT - NCAT, EOMI  Extremities - No C/C/E, No calf tenderness   Neurologic Exam -                  lethargic, only follows commands intermittently   appears to move extremities antigravity                        Sensory - unable to determine      Reflexes - DTR Intact, No primitive reflexive     Balance - not tested   Psychiatric - Mood stable, Affect WNL

## 2018-01-03 NOTE — PROGRESS NOTE ADULT - PROBLEM SELECTOR PLAN 3
Likely hepatorenal   Avoid nephrotoxins  Monitor Strict I & O, avoid nephrotoxins  Await abdominal Ultrasound
Likely pre-renal  Avoid nephrotoxins  Monitor Strict I & O, avoid nephrotoxins  Await abdominal Ultrasound
Keep normotensive  Continue Enalapril and Metoprolol

## 2018-01-03 NOTE — PROGRESS NOTE ADULT - PROBLEM SELECTOR PLAN 2
-CT head  with hypodensity within the left cerebellum may represent an acute to subacute infarct.  MRI  with an acute infarct is again noted involving the left cerebellum as described, with new areas of patchy internal hemorrhagic transformation.   MRA head/neck-  High-grade stenosis versus occlusion likely involves the distal left superior cerebellar artery  DW Neuro-re- hhgic transformation. Neuro to reviews imaging  Await further recommendations  On ASA 81 mg/Neuro checks q 4  Await TTE   Await Neuro - re ILR
Markedly elevated LFT this AM- possibility of congestive hepatopathy  Albumin 4, Will check INR   No evidence of hypotension x 24 hours  Will check Pro BNP- added on   Will hold off Statin  will order Abd ultrasound /hep panel  Will obtain Hepatology consult- email sent
-recommend to hold off heparin given low possibility of ACS and risk of hemorrhagic conversion of CVA  -Will eventually need ischemic eval, continue ASA.    -Admit to tele  -Check echo  -If pt develops CP or any signs of ischemia, low threshold to start Heparin gtt  -per Neuro - ok to start Heparin if needed for ACS, maintain low PTT and no bolus  continue ASA/Metoprolol/Enalapril

## 2018-01-04 NOTE — PROGRESS NOTE ADULT - ASSESSMENT
76 yo man with HTN, DM, CAD, cerebrovascular disease admitted on 12/31 for acute stroke and was subsequently found to have markedly elevated aminotransferases with prolonged PT/INR concern for acute liver failure, who has subsequently been found to have decreased systolic function and has been put on dobutamine in the MICU. He most likely had ischemic hepatopathy in the setting of liver hypoperfusion. At this time, PT/INR are improving and aminotransferases are significantly improved. Bilirubin tends to lag behind aminotransferases and PT/INR in cases of ischemic hepatopathy.

## 2018-01-04 NOTE — PROGRESS NOTE ADULT - SUBJECTIVE AND OBJECTIVE BOX
Chief Complaint:  Patient is a 77y old  Male who presents with a chief complaint of dizziness/falls (2018 10:26)      Interval Events:   - patient was transfered to MICU   - he had bedside TTE by MICU fellow which showed hypokinetic LV and RV, based on this finding the patient was started on dobutamine infusion  - his mental status remains somnolent and waxes and wanes      Allergies:  No Known Allergies      Hospital Medications:    DOBUTamine Infusion 5 MICROgram(s)/kG/Min IV Continuous <Continuous>  glucagon  Injectable 1 milliGRAM(s) IntraMuscular once PRN  heparin  Injectable 5000 Unit(s) SubCutaneous every 8 hours  influenza   Vaccine 0.5 milliLiter(s) IntraMuscular once  insulin lispro (HumaLOG) corrective regimen sliding scale   SubCutaneous every 6 hours  sodium chloride 0.9%. 1000 milliLiter(s) IV Continuous <Continuous>      PMHX/PSHX:  CVA (cerebral vascular accident)  DM (diabetes mellitus)  HTN (hypertension)  No significant past surgical history      Family history:  No pertinent family history in first degree relatives      ROS:     patient is unable to provide due to aphasia and mental status      PHYSICAL EXAM:   Vital Signs:  Vital Signs Last 24 Hrs  T(C): 36.7 (2018 12:00), Max: 37.1 (2018 15:41)  T(F): 98 (2018 12:00), Max: 98.7 (2018 15:41)  HR: 91 (2018 12:00) (84 - 114)  BP: 154/76 (2018 12:00) (127/71 - 171/108)  BP(mean): 96 (2018 12:00) (84 - 124)  RR: 16 (2018 12:00) (0 - 32)  SpO2: 97% (2018 12:00) (92% - 100%)  Daily     Daily Weight in k.6 (2018 06:00)    GENERAL:  somnolent, in MICU but NAD  HEENT:  scleral icterus  CHEST:  no respiratory distress, CTAB  HEART:  RRR, no MRG, no edema  ABDOMEN:  Soft, tender in RUQ, non-distended, no masses , palpable liver edge   EXTREMITIES:  no cyanosis, no edema  SKIN:  mild jaundice  NEURO:  somnolent    LABS:                        16.8   15.39 )-----------( 109      ( 2018 02:51 )             52.6     01-04    150<H>  |  112<H>  |  82<H>  ----------------------------<  235<H>  4.0   |  20<L>  |  1.67<H>    Ca    9.1      2018 02:51  Phos  3.8     -  Mg     2.4     -    TPro  5.8<L>  /  Alb  3.2<L>  /  TBili  6.8<H>  /  DBili  x   /  AST  974<H>  /  ALT  1338<H>  /  AlkPhos  62  -04    LIVER FUNCTIONS - ( 2018 02:51 )  Alb: 3.2 g/dL / Pro: 5.8 g/dL / ALK PHOS: 62 u/L / ALT: 1338 u/L / AST: 974 u/L / GGT: x           PT/INR - ( 2018 11:30 )   PT: 26.5 SEC;   INR: 2.27          PTT - ( 2018 16:00 )  PTT:35.7 SEC      Imaging:

## 2018-01-04 NOTE — PROGRESS NOTE ADULT - SUBJECTIVE AND OBJECTIVE BOX
Interval events:  Patient transferred for worsening lethargy/mental status and worsening transaminitis. Patient initiated on dobutamin 5 mcg/kg/min for possible RV failure on bedside echo. LFTs downtrended. Patient remains without significant mental status.    Medications:  dextrose 5%. 1000 milliLiter(s) IV Continuous <Continuous>  dextrose 50% Injectable 12.5 Gram(s) IV Push once  dextrose 50% Injectable 25 Gram(s) IV Push once  dextrose 50% Injectable 25 Gram(s) IV Push once  dextrose Gel 1 Dose(s) Oral once PRN  DOBUTamine Infusion 5 MICROgram(s)/kG/Min IV Continuous <Continuous>  glucagon  Injectable 1 milliGRAM(s) IntraMuscular once PRN  influenza   Vaccine 0.5 milliLiter(s) IntraMuscular once  insulin lispro (HumaLOG) corrective regimen sliding scale   SubCutaneous every 6 hours  sodium chloride 0.9%. 1000 milliLiter(s) IV Continuous <Continuous>    Vitals:  T(C): 36.6 (18 @ 04:00), Max: 37.1 (18 @ 15:41)  HR: 90 (18 @ 06:00) (78 - 114)  BP: 136/93 (18 @ 06:00) (127/71 - 171/108)  BP(mean): 102 (18 @ 06:00) (84 - 121)  RR: 27 (18 @ 06:00) (0 - 32)  SpO2: 95% (18 @ 06:00) (94% - 100%)  Wt(kg): --  Daily     Daily Weight in k.6 (2018 06:00)  I&O's Summary    2018 07:01  -  2018 06:59  --------------------------------------------------------  IN: 147.7 mL / OUT: 120 mL / NET: 27.7 mL        Physical Exam:  Appearance:  Normal, NAD  Eyes: PERRL, EOMI  HENT: Normal oral [place] NC/AT  Cardiovascular: S1, S2, RRR, No m/r/g appreciated, No edema, no elevation in JVP  Respiratory: Clear to auscultation bilaterally  Gastrointestinal: Soft, Non-tender, Non-distended, BS+  Musculoskeletal:  No clubbing, No joint deformity   Neurologic: Non-focal  Lymphatic: No lymphadenopathy  Psychiatry: AAOx3, Mood & affect appropriate  Skin: No rashes, No ecchymoses, No cyanosis    Labs:                        16.8   15.39 )-----------( 109      ( 2018 02:51 )             52.6     01-04    150<H>  |  112<H>  |  82<H>  ----------------------------<  235<H>  4.0   |  20<L>  |  1.67<H>    Ca    9.1      2018 02:51  Phos  3.8     01-04  Mg     2.4     -04    TPro  5.8<L>  /  Alb  3.2<L>  /  TBili  6.8<H>  /  DBili  x   /  AST  974<H>  /  ALT  1338<H>  /  AlkPhos  62  01-04    PT/INR - ( 2018 22:20 )   PT: 28.2 SEC;   INR: 2.41          PTT - ( 2018 16:00 )  PTT:35.7 SEC  CARDIAC MARKERS ( 2018 02:51 )  x     / 0.69 ng/mL / 112 u/L / 3.82 ng/mL / x      CARDIAC MARKERS ( 2018 22:20 )  x     / 0.70 ng/mL / x     / x     / x      CARDIAC MARKERS ( 2018 16:00 )  x     / 0.68 ng/mL / 126 u/L / 3.73 ng/mL / x      CARDIAC MARKERS ( 2018 07:40 )  x     / 0.42 ng/mL / 145 u/L / x     / x          Serum Pro-Brain Natriuretic Peptide: 85808 pg/mL ( @ 07:40)    New results/imaging:  CT 1/3/2018  Impression: Previously noted acute left cerebellar infarct is again seen.

## 2018-01-04 NOTE — PROGRESS NOTE ADULT - PROBLEM SELECTOR PLAN 1
- trend PT/INR Q8 hrs for now  - supportive care per MICU with BP support as needed  - avoid hepatotoxins  - f/u viral and autoimmune hepatitis serologies that were sent

## 2018-01-04 NOTE — PROGRESS NOTE ADULT - SUBJECTIVE AND OBJECTIVE BOX
INTERVAL HPI/OVERNIGHT EVENTS:    SUBJECTIVE: Patient seen and examined at bedside.     CONSTITUTIONAL: No weakness, fevers or chills  EYES/ENT: No visual changes;  No vertigo or throat pain   NECK: No pain or stiffness  RESPIRATORY: No cough, wheezing, hemoptysis; No shortness of breath  CARDIOVASCULAR: No chest pain or palpitations  GASTROINTESTINAL: No abdominal or epigastric pain. No nausea, vomiting, or hematemesis; No diarrhea or constipation. No melena or hematochezia.  GENITOURINARY: No dysuria, frequency or hematuria  NEUROLOGICAL: No numbness or weakness  SKIN: No itching, rashes    OBJECTIVE:    VITAL SIGNS:  ICU Vital Signs Last 24 Hrs  T(C): 36.6 (04 Jan 2018 04:00), Max: 37.1 (03 Jan 2018 15:41)  T(F): 97.8 (04 Jan 2018 04:00), Max: 98.7 (03 Jan 2018 15:41)  HR: 90 (04 Jan 2018 07:00) (78 - 114)  BP: 130/98 (04 Jan 2018 07:00) (127/71 - 171/108)  BP(mean): 106 (04 Jan 2018 07:00) (84 - 121)  ABP: --  ABP(mean): --  RR: 26 (04 Jan 2018 07:00) (0 - 32)  SpO2: 97% (04 Jan 2018 07:00) (94% - 100%)        01-03 @ 07:01  -  01-04 @ 07:00  --------------------------------------------------------  IN: 160 mL / OUT: 120 mL / NET: 40 mL      CAPILLARY BLOOD GLUCOSE  444 (03 Jan 2018 11:37)      POCT Blood Glucose.: 184 mg/dL (04 Jan 2018 06:16)      PHYSICAL EXAM:    General: NAD  HEENT: NC/AT; PERRL, clear conjunctiva  Neck: supple  Respiratory: CTA b/l  Cardiovascular: +S1/S2; RRR  Abdomen: soft, NT/ND; +BS x4  Extremities: WWP, 2+ peripheral pulses b/l; no LE edema  Skin: normal color and turgor; no rash  Neurological:    MEDICATIONS:  MEDICATIONS  (STANDING):  dextrose 5%. 1000 milliLiter(s) (50 mL/Hr) IV Continuous <Continuous>  dextrose 50% Injectable 12.5 Gram(s) IV Push once  dextrose 50% Injectable 25 Gram(s) IV Push once  dextrose 50% Injectable 25 Gram(s) IV Push once  DOBUTamine Infusion 5 MICROgram(s)/kG/Min (12.3 mL/Hr) IV Continuous <Continuous>  influenza   Vaccine 0.5 milliLiter(s) IntraMuscular once  insulin lispro (HumaLOG) corrective regimen sliding scale   SubCutaneous every 6 hours  sodium chloride 0.9%. 1000 milliLiter(s) (50 mL/Hr) IV Continuous <Continuous>    MEDICATIONS  (PRN):  dextrose Gel 1 Dose(s) Oral once PRN Blood Glucose LESS THAN 70 milliGRAM(s)/deciliter  glucagon  Injectable 1 milliGRAM(s) IntraMuscular once PRN Glucose LESS THAN 70 milligrams/deciliter      ALLERGIES:  Allergies    No Known Allergies    Intolerances        LABS:                        16.8   15.39 )-----------( 109      ( 04 Jan 2018 02:51 )             52.6     01-04    150<H>  |  112<H>  |  82<H>  ----------------------------<  235<H>  4.0   |  20<L>  |  1.67<H>    Ca    9.1      04 Jan 2018 02:51  Phos  3.8     01-04  Mg     2.4     01-04    TPro  5.8<L>  /  Alb  3.2<L>  /  TBili  6.8<H>  /  DBili  x   /  AST  974<H>  /  ALT  1338<H>  /  AlkPhos  62  01-04    PT/INR - ( 03 Jan 2018 22:20 )   PT: 28.2 SEC;   INR: 2.41          PTT - ( 03 Jan 2018 16:00 )  PTT:35.7 SEC      RADIOLOGY & ADDITIONAL TESTS: Reviewed. INTERVAL HPI/OVERNIGHT EVENTS: stable overnight, transaminitis improving, bili increasing.    SUBJECTIVE: Patient seen and examined at bedside.     OBJECTIVE:    VITAL SIGNS:  ICU Vital Signs Last 24 Hrs  T(C): 36.6 (04 Jan 2018 04:00), Max: 37.1 (03 Jan 2018 15:41)  T(F): 97.8 (04 Jan 2018 04:00), Max: 98.7 (03 Jan 2018 15:41)  HR: 90 (04 Jan 2018 07:00) (78 - 114)  BP: 130/98 (04 Jan 2018 07:00) (127/71 - 171/108)  BP(mean): 106 (04 Jan 2018 07:00) (84 - 121)  ABP: --  ABP(mean): --  RR: 26 (04 Jan 2018 07:00) (0 - 32)  SpO2: 97% (04 Jan 2018 07:00) (94% - 100%)        01-03 @ 07:01  -  01-04 @ 07:00  --------------------------------------------------------  IN: 160 mL / OUT: 120 mL / NET: 40 mL      CAPILLARY BLOOD GLUCOSE  444 (03 Jan 2018 11:37)      POCT Blood Glucose.: 184 mg/dL (04 Jan 2018 06:16)      PHYSICAL EXAM:    General: Ill-appearing  HEENT: NC/AT  Neck: Supple   Respiratory: Grossly CTAB; No wheeze appreciated  Cardiovascular: RRR; +S1/S2  Abdomen: +BS, Soft, tender, No rigidity  Extremities: No peripheral edema  Skin: Warm and dry  Neurological: awake, follows commands, speech is incomprehensible, moving all limbs   Psychiatry: Unable to assess    MEDICATIONS:  MEDICATIONS  (STANDING):  dextrose 5%. 1000 milliLiter(s) (50 mL/Hr) IV Continuous <Continuous>  dextrose 50% Injectable 12.5 Gram(s) IV Push once  dextrose 50% Injectable 25 Gram(s) IV Push once  dextrose 50% Injectable 25 Gram(s) IV Push once  DOBUTamine Infusion 5 MICROgram(s)/kG/Min (12.3 mL/Hr) IV Continuous <Continuous>  influenza   Vaccine 0.5 milliLiter(s) IntraMuscular once  insulin lispro (HumaLOG) corrective regimen sliding scale   SubCutaneous every 6 hours  sodium chloride 0.9%. 1000 milliLiter(s) (50 mL/Hr) IV Continuous <Continuous>    MEDICATIONS  (PRN):  dextrose Gel 1 Dose(s) Oral once PRN Blood Glucose LESS THAN 70 milliGRAM(s)/deciliter  glucagon  Injectable 1 milliGRAM(s) IntraMuscular once PRN Glucose LESS THAN 70 milligrams/deciliter      ALLERGIES:  Allergies    No Known Allergies    Intolerances        LABS:                        16.8   15.39 )-----------( 109      ( 04 Jan 2018 02:51 )             52.6     01-04    150<H>  |  112<H>  |  82<H>  ----------------------------<  235<H>  4.0   |  20<L>  |  1.67<H>    Ca    9.1      04 Jan 2018 02:51  Phos  3.8     01-04  Mg     2.4     01-04    TPro  5.8<L>  /  Alb  3.2<L>  /  TBili  6.8<H>  /  DBili  x   /  AST  974<H>  /  ALT  1338<H>  /  AlkPhos  62  01-04    PT/INR - ( 03 Jan 2018 22:20 )   PT: 28.2 SEC;   INR: 2.41          PTT - ( 03 Jan 2018 16:00 )  PTT:35.7 SEC      RADIOLOGY & ADDITIONAL TESTS: Reviewed.

## 2018-01-04 NOTE — PROGRESS NOTE ADULT - ASSESSMENT
ASSESSMENT AND PLAN:  Patient is a 78 y/o M w/ a PMHx of T2DM, HTN, HLD, and remote CVA (2012) with no residual weakness who was initially admitted for ACS r/o and workup of an acute L cerebellar infarct with hospital course c/b encephalopathy likely 2/2 acute liver failure of unknown etiology.    # Neuro  - Patient found to have an acute L cerebellar infarct this admission. Of note, Brain MRI also showed new areas of patchy internal hemorrhagic   transformation. Patient was acutely encephalopathic this AM. Code stroke called, Head CT was unchanged from prior. Encephalopathy is likely 2/2 acute liver failure given worsening transaminitis and coagulopathy. Ammonia was checked and was WNL. Will closely monitor for improvement in mental status.    # Pulm  - Patient is currently saturating well on 2L NC. If encephalopathy worsens, will evaluate need for intubation for airway protection    # CV  - Patient has remained HDS off pressors. He was found to have + Lidia on admission which was suspected to be related to patient's acute CVA per Cardiology. On POCUS this afternoon, patient's R heart function appears to be poor. Concern that patient may have poor forward flow resulting in ? congestive hepatopathy leading to his significant transaminitis. Will start patient on a Dobutamine gtt for now. Patient w/ elevated Troponin this afternoon (0.68) this afternoon in the setting of TATO. CK/CK-MB negative. EKG/R-sided EKG with no apparent acute ST changes. Of note, pro-BNP was found to be 98034 on 1/2. Will monitor Lidia Q6H for now.    # GI  - Patient with acute liver failure possibly 2/2 congestive hepatopathy as noted above. Patient has noted been acute hypotensive this hospitalization. No new concerning medications. Hepatitis panel currently negative. Abdominal U/S with no concerning findings (hepatic vein was patent). Tox screen was negative.   - GI following, appreciate recs. Will c/w autoimmune/infectious workup. Will monitor LFTs and coags Q6H for now.    # Renal  - Patient with TATO (Cr = 1.77 this afternoon) possibly 2/2 cardiorenal due to poor flow? Patient started on a Dobutamine gtt.   - Strict Is/Os. Monitor BMP    # ID  - Patient with a leukocytosis which has grossly remained stable. He is currently afebrile. He is without signs or symptoms of infection. Acute hepatitis panel currently negative. Will monitor off antibiotics.  - Monitor CBC and fever curve    # Endo  - Patient with history of T2DM. Hold home oral diabetes medications  - C/w HISS. Monitor FS    # DVT PPx  - Given new areas of patchy internal hemorrhagic transformation on brain MRI, will hold off pharmacologic AC for now. Will start SCDs. ASSESSMENT AND PLAN:  Patient is a 76 y/o M w/ a PMHx of T2DM, HTN, HLD, and remote CVA (2012) with no residual weakness who was initially admitted for ACS r/o and workup of an acute L cerebellar infarct with hospital course c/b encephalopathy likely 2/2 acute liver failure of unknown etiology.    # Neuro  - Patient found to have an acute L cerebellar infarct this admission. Of note, Brain MRI also showed new areas of patchy internal hemorrhagic transformation. Patient was acutely encephalopathic 1/3. Code stroke called, Head CT was unchanged from prior. Encephalopathy is likely 2/2 acute liver failure given worsening transaminitis and coagulopathy. Ammonia was checked and was WNL. Will closely monitor for improvement in mental status.  Neurology have recommended 3 months of ASA and plavix, then ASA alone, and atorvastatin once liver enzymes and coagulation normalizes.    # Pulm  - Patient is currently saturating well on 2L NC. If encephalopathy worsens, will evaluate need for intubation for airway protection    # CV  - Patient has remained HDS off pressors. He was found to have + Lidia on admission which was suspected to be related to patient's acute CVA per Cardiology. On POCUS 1/3 patient's R heart function appears to be poor. Concern that patient may have poor forward flow resulting in congestive hepatopathy leading to his significant transaminitis. Started on Dobutamine. Patient w/ elevated Troponins in the setting of TATO. CK/CK-MB negative. EKG/R-sided EKG with no apparent acute ST changes. Of note, pro-BNP was found to be 45705 on 1/2. Will monitor Lidia Q6H now downtrending.  - continue dobuatamine  - monitor cardiac enzymes and LFTs    # GI  - Patient with acute liver failure possibly 2/2 congestive hepatopathy as noted above. Patient has not been acutly hypotensive this hospitalization. No new concerning medications. Hepatitis panel currently negative. Abdominal U/S with no concerning findings (hepatic vein was patent). Tox screen was negative.  - GI following, appreciate recs. Will c/w autoimmune/infectious workup. Will monitor LFTs and coags.    # Renal  - Patient with TATO (Cr = 1.77 this afternoon) possibly 2/2 cardiorenal due to poor flow? Patient started on a Dobutamine gtt.   - Strict Is/Os. Monitor BMP    # ID  - Patient with a leukocytosis which has grossly remained stable. He is currently afebrile. He is without signs or symptoms of infection. Acute hepatitis panel currently negative. Will monitor off antibiotics.  - Monitor CBC and fever curve    # Endo  - Patient with history of T2DM. Hold home oral diabetes medications  - C/w HISS. Monitor FS    # DVT PPx  - HSQ      Chris BEAULIEU  Internal Medicine Intern  page [phone] ASSESSMENT AND PLAN:  Patient is a 76 y/o M w/ a PMHx of T2DM, HTN, HLD, and remote CVA (2012) with no residual weakness who was initially admitted for ACS r/o and workup of an acute L cerebellar infarct with hospital course c/b encephalopathy likely 2/2 acute liver failure of unknown etiology.    # Neuro  - Patient found to have an acute L cerebellar infarct this admission. Of note, Brain MRI also showed new areas of patchy internal hemorrhagic transformation. Patient was acutely encephalopathic 1/3. Code stroke called, Head CT was unchanged from prior. Encephalopathy is likely 2/2 acute liver failure given worsening transaminitis and coagulopathy. Ammonia was checked and was WNL. Will closely monitor for improvement in mental status.  Neurology have recommended 3 months of ASA and plavix, then ASA alone, and atorvastatin once liver enzymes and coagulation normalizes.    # Pulm  - Patient is currently saturating well on 2L NC. If encephalopathy worsens, will evaluate need for intubation for airway protection    # CV  - Patient has remained HDS off pressors. He was found to have + Lidia on admission which was suspected to be related to patient's acute CVA per Cardiology. On POCUS 1/3 patient's R heart function appears to be poor. Concern that patient may have poor forward flow resulting in congestive hepatopathy leading to his significant transaminitis. Started on Dobutamine. Patient w/ elevated Troponins in the setting of TATO. CK/CK-MB negative. EKG/R-sided EKG with no apparent acute ST changes. Of note, pro-BNP was found to be 53227 on 1/2. Will monitor Lidia Q6H now downtrending.  - continue dobuatamine  - monitor cardiac enzymes and LFTs    # GI  - Patient with acute liver failure possibly 2/2 congestive hepatopathy as noted above. Patient has not been acutly hypotensive this hospitalization. No new concerning medications. Hepatitis panel currently negative. Abdominal U/S with no concerning findings (hepatic vein was patent). Tox screen was negative.  - GI following, appreciate recs. Will c/w autoimmune/infectious workup. Will monitor LFTs and coags.  - ko tube today if tolerated and start feeds.    # Renal  - Patient with TATO (Cr = 1.77 this afternoon) possibly 2/2 cardiorenal due to poor flow? Patient started on a Dobutamine gtt.   - Strict Is/Os. Monitor BMP    # ID  - Patient with a leukocytosis which has grossly remained stable. He is currently afebrile. He is without signs or symptoms of infection. Acute hepatitis panel currently negative. Will monitor off antibiotics.  - Monitor CBC and fever curve    # Endo  - Patient with history of T2DM. Hold home oral diabetes medications  - C/w HISS. Monitor FS    # DVT PPx  - HSQ      Chris BRANNONBS  Internal Medicine Intern  page [phone]

## 2018-01-05 NOTE — PROGRESS NOTE ADULT - ASSESSMENT
78 yo man with HTN, DM, CAD, cerebrovascular disease admitted on 12/31 for acute stroke and was subsequently found to have markedly elevated aminotransferases with prolonged PT/INR concern for acute liver failure, who has subsequently been found to have decreased systolic function and was treated with dobutamine in the MICU. He most likely had ischemic hepatopathy in the setting of liver hypoperfusion. At this time, PT/INR are improving and aminotransferases are significantly improved. Bilirubin tends to lag behind aminotransferases and PT/INR in cases of ischemic hepatopathy. 78 yo man with HTN, DM, CAD, cerebrovascular disease admitted on 12/31 for acute stroke and was subsequently found to have markedly elevated aminotransferases with an acute rise from ~100 on 1/2/17 to 2629 and 2027 two days later, with prolonged PT/INR concern for acute liver failure. He has subsequently been found to have decreased systolic function and was treated with dobutamine in the MICU. He most likely had ischemic hepatopathy in the setting of liver hypoperfusion. At this time, PT/INR are improving and aminotransferases are significantly improved. Bilirubin tends to lag behind aminotransferases and PT/INR in cases of ischemic hepatopathy.

## 2018-01-05 NOTE — PROGRESS NOTE ADULT - PROBLEM SELECTOR PROBLEM 1
Acute liver failure without hepatic coma
CVA (cerebral vascular accident)
Elevated liver enzymes
Elevated liver enzymes
CVA (cerebral vascular accident)

## 2018-01-05 NOTE — PROGRESS NOTE ADULT - PROBLEM SELECTOR PLAN 1
- supportive care per MICU with BP support as needed  - avoid hepatotoxins  - trend liver enzymes including PT/INR daily - supportive care per MICU with BP support as needed  - trend liver enzymes including PT/INR daily

## 2018-01-05 NOTE — PROGRESS NOTE ADULT - SUBJECTIVE AND OBJECTIVE BOX
Interval events:  No acute events overnight. Dobutamine discontinued due to increasing PAT. Patient with purposeless movements. Not responding to verbal stimuli.    Medications:  dextrose 5%. 1000 milliLiter(s) IV Continuous <Continuous>  dextrose 5%. 1000 milliLiter(s) IV Continuous <Continuous>  dextrose 50% Injectable 12.5 Gram(s) IV Push once  dextrose 50% Injectable 25 Gram(s) IV Push once  dextrose 50% Injectable 25 Gram(s) IV Push once  dextrose Gel 1 Dose(s) Oral once PRN  glucagon  Injectable 1 milliGRAM(s) IntraMuscular once PRN  influenza   Vaccine 0.5 milliLiter(s) IntraMuscular once  insulin lispro (HumaLOG) corrective regimen sliding scale   SubCutaneous every 6 hours    Vitals:  T(C): 36.3 (18 @ 04:00), Max: 36.7 (18 @ 08:00)  HR: 86 (18 @ 06:49) (86 - 127)  BP: 153/90 (18 @ 06:49) (91/67 - 169/99)  BP(mean): 104 (18 @ 06:49) (73 - 124)  RR: 23 (18 @ 06:49) (9 - 29)  SpO2: 96% (18 @ 06:49) (92% - 100%)  Wt(kg): --  Daily     Daily Weight in k (2018 06:00)  I&O's Summary    2018 07:01  -  2018 07:00  --------------------------------------------------------  IN: 1659.9 mL / OUT: 0 mL / NET: 1659.9 mL      Physical Exam:  Appearance:  Normal, NAD  Eyes: PERRL, EOMI  HENT: Normal oral [place] NC/AT  Cardiovascular: S1, S2, RRR, No m/r/g appreciated, No edema, no elevation in JVP  Respiratory: Clear to auscultation bilaterally  Gastrointestinal: Soft, Non-tender, Non-distended, BS+  Musculoskeletal:  No clubbing, No joint deformity   Neurologic: Non-focal  Lymphatic: No lymphadenopathy  Psychiatry: AAOx0  Skin: No rashes, No ecchymoses, No cyanosis    Labs:                        17.4   16.68 )-----------( 50       ( 2018 05:00 )             52.8     01-    152<H>  |  114<H>  |  85<H>  ----------------------------<  272<H>  3.8   |  25  |  1.53<H>    Ca    9.6      2018 05:00  Phos  2.3       Mg     2.6         TPro  5.9<L>  /  Alb  3.4  /  TBili  14.0<H>  /  DBili  x   /  AST  330<H>  /  ALT  939<H>  /  AlkPhos  63  -05    PT/INR - ( 2018 05:00 )   PT: 20.5 SEC;   INR: 1.76          PTT - ( 2018 05:00 )  PTT:35.1 SEC  CARDIAC MARKERS ( 2018 11:30 )  x     / 0.51 ng/mL / 136 u/L / 4.50 ng/mL / x      CARDIAC MARKERS ( 2018 02:51 )  x     / 0.69 ng/mL / 112 u/L / 3.82 ng/mL / x      CARDIAC MARKERS ( 2018 22:20 )  x     / 0.70 ng/mL / x     / x     / x      CARDIAC MARKERS ( 2018 16:00 )  x     / 0.68 ng/mL / 126 u/L / 3.73 ng/mL / x          Serum Pro-Brain Natriuretic Peptide: 83768 pg/mL ( @ 07:40)

## 2018-01-05 NOTE — PROGRESS NOTE ADULT - ASSESSMENT
ASSESSMENT AND PLAN:  Patient is a 76 y/o M w/ a PMHx of T2DM, HTN, HLD, and remote CVA (2012) with no residual weakness who was initially admitted for ACS r/o and workup of an acute L cerebellar infarct with hospital course c/b encephalopathy likely 2/2 acute liver failure of unknown etiology.    # Neuro  - Patient found to have an acute L cerebellar infarct this admission. Of note, Brain MRI also showed new areas of patchy internal hemorrhagic transformation. Patient was acutely encephalopathic 1/3. Code stroke called, Head CT was unchanged from prior. Encephalopathy is likely 2/2 acute liver failure given worsening transaminitis and coagulopathy. Ammonia was checked and was WNL. Will closely monitor for improvement in mental status.  Neurology have recommended 3 months of ASA and plavix, then ASA alone, and atorvastatin once liver enzymes and coagulation normalizes.    # Pulm  - Patient is currently saturating well on 2L NC. If encephalopathy worsens, will evaluate need for intubation for airway protection    # CV  - Patient has remained HDS off pressors. He was found to have + Lidia on admission which was suspected to be related to patient's acute CVA per Cardiology. On POCUS 1/3 patient's R heart function appears to be poor. Concern that patient may have poor forward flow resulting in congestive hepatopathy leading to his significant transaminitis. Started on Dobutamine. Patient w/ elevated Troponins in the setting of TATO. CK/CK-MB negative. EKG/R-sided EKG with no apparent acute ST changes. Of note, pro-BNP was found to be 74964 on 1/2. Will monitor Lidia Q6H now downtrending.  - continue dobuatamine  - monitor cardiac enzymes and LFTs    # GI  - Patient with acute liver failure possibly 2/2 congestive hepatopathy as noted above. Patient has not been acutly hypotensive this hospitalization. No new concerning medications. Hepatitis panel currently negative. Abdominal U/S with no concerning findings (hepatic vein was patent). Tox screen was negative.  - GI following, appreciate recs. Will c/w autoimmune/infectious workup. Will monitor LFTs and coags.  - ko tube today if tolerated and start feeds.    # Renal  - Patient with TATO (Cr = 1.77 this afternoon) possibly 2/2 cardiorenal due to poor flow? Patient started on a Dobutamine gtt.   - Strict Is/Os. Monitor BMP    # ID  - Patient with a leukocytosis which has grossly remained stable. He is currently afebrile. He is without signs or symptoms of infection. Acute hepatitis panel currently negative. Will monitor off antibiotics.  - Monitor CBC and fever curve    # Endo  - Patient with history of T2DM. Hold home oral diabetes medications  - C/w HISS. Monitor FS    # DVT PPx  - HSQ      Chris BRANNONBS  Internal Medicine Intern  page [phone] ASSESSMENT AND PLAN:  Patient is a 76 y/o M w/ a PMHx of T2DM, HTN, HLD, and remote CVA (2012) with no residual weakness who was initially admitted for ACS r/o and workup of an acute L cerebellar infarct with hospital course c/b encephalopathy likely 2/2 acute liver failure.    # Neuro  - Patient found to have an acute L cerebellar infarct this admission. Of note, Brain MRI also showed new areas of patchy internal hemorrhagic transformation. Patient was acutely encephalopathic 1/3. Code stroke called, Head CT was unchanged from prior. Encephalopathy is likely 2/2 acute liver failure given worsening transaminitis and coagulopathy. Ammonia was checked and was WNL. Will closely monitor for improvement in mental status.  Neurology have recommended 3 months of ASA and plavix, then ASA alone, and atorvastatin once liver enzymes and coagulation normalizes.  - TTE pending    # Pulm  - Patient is currently saturating well on 2L NC.    # CV  - Patient has remained HDS off pressors. He was found to have + Lidia on admission which was suspected to be related to patient's acute CVA per Cardiology. On POCUS 1/3 patient's R heart function appears to be poor. Concern that patient may have poor forward flow resulting in congestive hepatopathy leading to his significant transaminitis. Started on Dobutamine. Patient w/ elevated Troponins in the setting of TATO. CK/CK-MB negative. EKG/R-sided EKG with no apparent acute ST changes. Of note, pro-BNP was found to be 62497 on 1/2.  - dobutamine now ceased due to SVT  - monitor LFTs    # GI  - Patient with acute liver failure possibly 2/2 congestive hepatopathy as noted above. Patient has not been acutely hypotensive this hospitalization. No new concerning medications. Hepatitis panel currently negative. Abdominal U/S with no concerning findings (hepatic vein was patent). Tox screen was negative.  - GI following, appreciate recs. Will c/w autoimmune/infectious workup. Will monitor LFTs and coags.  - ko tube placed twice and pt removed both times.    # Renal  - Patient with TATO possibly 2/2 cardiorenal due to poor flow? creatinine now improving.  - Strict Is/Os. Monitor BMP    # ID  - Patient with a leukocytosis which has grossly remained stable. He is currently afebrile. He is without signs or symptoms of infection. Acute hepatitis panel currently negative. Will monitor off antibiotics.  - Monitor CBC and fever curve    # Endo  - Patient with history of T2DM. Hold home oral diabetes medications  - C/w HISS. Monitor FS    # Heme  - worsening thrombocytopenia, hold heparin and send HIT test    # DVT PPx  - HSQ held due to thrombocytopenia.      Chris BEAULIEU  Internal Medicine Intern  page [phone]

## 2018-01-05 NOTE — PROGRESS NOTE ADULT - SUBJECTIVE AND OBJECTIVE BOX
INTERVAL HPI/OVERNIGHT EVENTS:    SUBJECTIVE: Patient seen and examined at bedside.     OBJECTIVE:    VITAL SIGNS:  ICU Vital Signs Last 24 Hrs  T(C): 36.3 (05 Jan 2018 04:00), Max: 36.7 (04 Jan 2018 12:00)  T(F): 97.3 (05 Jan 2018 04:00), Max: 98 (04 Jan 2018 12:00)  HR: 86 (05 Jan 2018 06:49) (86 - 127)  BP: 153/90 (05 Jan 2018 06:49) (91/67 - 169/99)  BP(mean): 104 (05 Jan 2018 06:49) (73 - 124)  ABP: --  ABP(mean): --  RR: 23 (05 Jan 2018 06:49) (9 - 29)  SpO2: 96% (05 Jan 2018 06:49) (92% - 100%)        01-04 @ 07:01  -  01-05 @ 07:00  --------------------------------------------------------  IN: 1659.9 mL / OUT: 0 mL / NET: 1659.9 mL      CAPILLARY BLOOD GLUCOSE  444 (03 Jan 2018 11:37)      POCT Blood Glucose.: 220 mg/dL (05 Jan 2018 05:11)      PHYSICAL EXAM:    General: Ill-appearing  HEENT: NC/AT  Neck: Supple   Respiratory: Grossly CTAB; No wheeze appreciated  Cardiovascular: RRR; +S1/S2  Abdomen: +BS, Soft, tender, No rigidity  Extremities: No peripheral edema  Skin: Warm and dry  Neurological: awake, follows commands, speech is incomprehensible, moving all limbs   Psychiatry: Unable to assess    MEDICATIONS:  MEDICATIONS  (STANDING):  dextrose 5%. 1000 milliLiter(s) (75 mL/Hr) IV Continuous <Continuous>  dextrose 5%. 1000 milliLiter(s) (50 mL/Hr) IV Continuous <Continuous>  dextrose 50% Injectable 12.5 Gram(s) IV Push once  dextrose 50% Injectable 25 Gram(s) IV Push once  dextrose 50% Injectable 25 Gram(s) IV Push once  influenza   Vaccine 0.5 milliLiter(s) IntraMuscular once  insulin lispro (HumaLOG) corrective regimen sliding scale   SubCutaneous every 6 hours    MEDICATIONS  (PRN):  dextrose Gel 1 Dose(s) Oral once PRN Blood Glucose LESS THAN 70 milliGRAM(s)/deciliter  glucagon  Injectable 1 milliGRAM(s) IntraMuscular once PRN Glucose LESS THAN 70 milligrams/deciliter      ALLERGIES:  Allergies    No Known Allergies    Intolerances        LABS:                        17.4   16.68 )-----------( 50       ( 05 Jan 2018 05:00 )             52.8     01-05    152<H>  |  114<H>  |  85<H>  ----------------------------<  272<H>  3.8   |  25  |  1.53<H>    Ca    9.6      05 Jan 2018 05:00  Phos  2.3     01-05  Mg     2.6     01-05    TPro  5.9<L>  /  Alb  3.4  /  TBili  14.0<H>  /  DBili  x   /  AST  330<H>  /  ALT  939<H>  /  AlkPhos  63  01-05    PT/INR - ( 05 Jan 2018 05:00 )   PT: 20.5 SEC;   INR: 1.76          PTT - ( 05 Jan 2018 05:00 )  PTT:35.1 SEC      RADIOLOGY & ADDITIONAL TESTS: Reviewed. INTERVAL HPI/OVERNIGHT EVENTS:  agitated Tx with olanzapine  ng placed and pt pulled out this morning  dobutamine ceased due to svt    SUBJECTIVE: Patient seen and examined at bedside.     OBJECTIVE:    VITAL SIGNS:  ICU Vital Signs Last 24 Hrs  T(C): 36.3 (05 Jan 2018 04:00), Max: 36.7 (04 Jan 2018 12:00)  T(F): 97.3 (05 Jan 2018 04:00), Max: 98 (04 Jan 2018 12:00)  HR: 86 (05 Jan 2018 06:49) (86 - 127)  BP: 153/90 (05 Jan 2018 06:49) (91/67 - 169/99)  BP(mean): 104 (05 Jan 2018 06:49) (73 - 124)  ABP: --  ABP(mean): --  RR: 23 (05 Jan 2018 06:49) (9 - 29)  SpO2: 96% (05 Jan 2018 06:49) (92% - 100%)        01-04 @ 07:01  -  01-05 @ 07:00  --------------------------------------------------------  IN: 1659.9 mL / OUT: 0 mL / NET: 1659.9 mL      CAPILLARY BLOOD GLUCOSE  444 (03 Jan 2018 11:37)      POCT Blood Glucose.: 220 mg/dL (05 Jan 2018 05:11)      PHYSICAL EXAM:    General: jaundiced  HEENT: NC/AT  Neck: Supple   Respiratory: CTAB; No wheeze  Cardiovascular: RRR; +S1/S2  Abdomen: +BS, Soft, tender, No rigidity  Extremities: No peripheral edema  Skin: Warm and dry  Neurological: awake, follows commands, speech is incomprehensible, moving all limbs   Psychiatry: Unable to assess    MEDICATIONS:  MEDICATIONS  (STANDING):  dextrose 5%. 1000 milliLiter(s) (75 mL/Hr) IV Continuous <Continuous>  dextrose 5%. 1000 milliLiter(s) (50 mL/Hr) IV Continuous <Continuous>  dextrose 50% Injectable 12.5 Gram(s) IV Push once  dextrose 50% Injectable 25 Gram(s) IV Push once  dextrose 50% Injectable 25 Gram(s) IV Push once  influenza   Vaccine 0.5 milliLiter(s) IntraMuscular once  insulin lispro (HumaLOG) corrective regimen sliding scale   SubCutaneous every 6 hours    MEDICATIONS  (PRN):  dextrose Gel 1 Dose(s) Oral once PRN Blood Glucose LESS THAN 70 milliGRAM(s)/deciliter  glucagon  Injectable 1 milliGRAM(s) IntraMuscular once PRN Glucose LESS THAN 70 milligrams/deciliter      ALLERGIES:  Allergies    No Known Allergies    Intolerances        LABS:                        17.4   16.68 )-----------( 50       ( 05 Jan 2018 05:00 )             52.8     01-05    152<H>  |  114<H>  |  85<H>  ----------------------------<  272<H>  3.8   |  25  |  1.53<H>    Ca    9.6      05 Jan 2018 05:00  Phos  2.3     01-05  Mg     2.6     01-05    TPro  5.9<L>  /  Alb  3.4  /  TBili  14.0<H>  /  DBili  x   /  AST  330<H>  /  ALT  939<H>  /  AlkPhos  63  01-05    PT/INR - ( 05 Jan 2018 05:00 )   PT: 20.5 SEC;   INR: 1.76          PTT - ( 05 Jan 2018 05:00 )  PTT:35.1 SEC      RADIOLOGY & ADDITIONAL TESTS: Reviewed.

## 2018-01-05 NOTE — PROGRESS NOTE ADULT - ASSESSMENT
77 year old male with PMH of HTN, HLD, DM presents with acute stroke left cerebellum, patient reports he had chest pain 4 days ago at home, no current complaints of chest pain. In ED, CT scan of head reveals acute stroke, patient with elevated troponin and +CPK +CKMB. MR with left cerebellar stroke. Transferred to MICU for AMS and transaminitis, concern for RV failure. Patient's LFTs improved on dobutamine however clinical exam without LE edema or JVD which would coincide with RV failure.    -- f/u TTE  -- continue aspirin  -- hold metoprolol  -- hold statin in setting of transaminitis    London Becker MD

## 2018-01-05 NOTE — PROGRESS NOTE ADULT - SUBJECTIVE AND OBJECTIVE BOX
Chief Complaint:  Patient is a 77y old  Male who presents with a chief complaint of dizziness/falls (02 Jan 2018 10:26)      Interval Events:   - patient remains in MICU   - pt to have TTE today  - his mental status remains somnolent and waxes and wanes      Allergies:  No Known Allergies      Hospital Medications:    MEDICATIONS  (STANDING):    influenza   Vaccine 0.5 milliLiter(s) IntraMuscular once  insulin lispro (HumaLOG) corrective regimen sliding scale   SubCutaneous every 6 hours    MEDICATIONS  (PRN):  dextrose Gel 1 Dose(s) Oral once PRN Blood Glucose LESS THAN 70 milliGRAM(s)/deciliter  glucagon  Injectable 1 milliGRAM(s) IntraMuscular once PRN Glucose LESS THAN 70 milligrams/deciliter        PMHX/PSHX:  CVA (cerebral vascular accident)  DM (diabetes mellitus)  HTN (hypertension)  No significant past surgical history      Family history:  No pertinent family history in first degree relatives      ROS:     patient is unable to provide due to aphasia and mental status      PHYSICAL EXAM:   Vital Signs Last 24 Hrs  T(C): 35 (05 Jan 2018 12:00), Max: 36.3 (05 Jan 2018 04:00)  T(F): 95 (05 Jan 2018 12:00), Max: 97.3 (05 Jan 2018 04:00)  HR: 95 (05 Jan 2018 12:00) (86 - 127)  BP: 143/107 (05 Jan 2018 12:00) (89/67 - 169/99)  BP(mean): 115 (05 Jan 2018 12:00) (71 - 122)  RR: 23 (05 Jan 2018 12:00) (9 - 29)  SpO2: 95% (05 Jan 2018 12:00) (92% - 100%)    GENERAL:  somnolent, in MICU but NAD  HEENT:  scleral icterus  CHEST:  no respiratory distress, CTAB  HEART:  RRR, no MRG, no edema  ABDOMEN:  Soft, tender in RUQ, non-distended, no masses , palpable liver edge   EXTREMITIES:  no cyanosis, no edema  SKIN:  jaundice  NEURO:  somnolent    LABS:                                   17.4   16.68 )-----------( 50       ( 05 Jan 2018 05:00 )             52.8     01-05    152<H>  |  114<H>  |  85<H>  ----------------------------<  272<H>  3.8   |  25  |  1.53<H>    Ca    9.6      05 Jan 2018 05:00  Phos  2.3     01-05  Mg     2.6     01-05    TPro  5.9<L>  /  Alb  3.4  /  TBili  14.0<H>  /  DBili  x   /  AST  330<H>  /  ALT  939<H>  /  AlkPhos  63  01-05    PT/INR - ( 05 Jan 2018 05:00 )   PT: 20.5 SEC;   INR: 1.76          PTT - ( 05 Jan 2018 05:00 )  PTT:35.1 SEC      Imaging:    < from: US Abdomen Complete (01.03.18 @ 11:16) >  Liver: Within normal limits.    Bile ducts: Normal caliber. Common bile duct measures 4 mm.     Gallbladder: 5 mm nonmobile echogenic focus. Otherwise normal.       < end of copied text >

## 2018-01-06 NOTE — PROGRESS NOTE ADULT - SUBJECTIVE AND OBJECTIVE BOX
INTERVAL HPI/OVERNIGHT EVENTS:    SUBJECTIVE: Patient seen and examined at bedside.     OBJECTIVE:    VITAL SIGNS:  ICU Vital Signs Last 24 Hrs  T(C): 35.6 (06 Jan 2018 04:00), Max: 35.9 (05 Jan 2018 08:00)  T(F): 96 (06 Jan 2018 04:00), Max: 96.7 (05 Jan 2018 08:00)  HR: 107 (06 Jan 2018 07:00) (83 - 117)  BP: 144/62 (06 Jan 2018 07:00) (89/67 - 164/108)  BP(mean): 81 (06 Jan 2018 07:00) (48 - 121)  ABP: --  ABP(mean): --  RR: 11 (06 Jan 2018 07:00) (7 - 31)  SpO2: 96% (06 Jan 2018 07:00) (92% - 100%)        01-05 @ 07:01  -  01-06 @ 07:00  --------------------------------------------------------  IN: 825 mL / OUT: 3445 mL / NET: -2620 mL      CAPILLARY BLOOD GLUCOSE      POCT Blood Glucose.: 144 mg/dL (06 Jan 2018 05:15)      PHYSICAL EXAM:    General: jaundiced  HEENT: NC/AT  Neck: Supple   Respiratory: CTAB; No wheeze  Cardiovascular: RRR; +S1/S2  Abdomen: +BS, Soft, tender, No rigidity  Extremities: No peripheral edema  Skin: Warm and dry  Neurological: awake, follows commands, speech is incomprehensible, moving all limbs   Psychiatry: Unable to assess    MEDICATIONS:  MEDICATIONS  (STANDING):  chlorhexidine 4% Liquid 1 Application(s) Topical daily  dextrose 5%. 1000 milliLiter(s) (50 mL/Hr) IV Continuous <Continuous>  dextrose 50% Injectable 12.5 Gram(s) IV Push once  dextrose 50% Injectable 25 Gram(s) IV Push once  dextrose 50% Injectable 25 Gram(s) IV Push once  influenza   Vaccine 0.5 milliLiter(s) IntraMuscular once  insulin lispro (HumaLOG) corrective regimen sliding scale   SubCutaneous every 6 hours    MEDICATIONS  (PRN):  dextrose Gel 1 Dose(s) Oral once PRN Blood Glucose LESS THAN 70 milliGRAM(s)/deciliter  glucagon  Injectable 1 milliGRAM(s) IntraMuscular once PRN Glucose LESS THAN 70 milligrams/deciliter      ALLERGIES:  Allergies    No Known Allergies    Intolerances        LABS:                        17.7   22.36 )-----------( 50       ( 06 Jan 2018 03:45 )             51.9     01-06    159<H>  |  121<H>  |  68<H>  ----------------------------<  162<H>  4.0   |  26  |  1.21    Ca    9.6      06 Jan 2018 03:45  Phos  2.5     01-06  Mg     2.2     01-06    TPro  5.5<L>  /  Alb  3.1<L>  /  TBili  13.7<H>  /  DBili  x   /  AST  271<H>  /  ALT  696<H>  /  AlkPhos  58  01-06    PT/INR - ( 06 Jan 2018 03:45 )   PT: 19.5 SEC;   INR: 1.74          PTT - ( 06 Jan 2018 03:45 )  PTT:35.6 SEC      RADIOLOGY & ADDITIONAL TESTS: Reviewed. INTERVAL HPI/OVERNIGHT EVENTS: stable overnight, gradual improvement in mental state    SUBJECTIVE: Patient seen and examined at bedside.  pt unable to provide history due to mental state    OBJECTIVE:    VITAL SIGNS:  ICU Vital Signs Last 24 Hrs  T(C): 35.6 (06 Jan 2018 04:00), Max: 35.9 (05 Jan 2018 08:00)  T(F): 96 (06 Jan 2018 04:00), Max: 96.7 (05 Jan 2018 08:00)  HR: 107 (06 Jan 2018 07:00) (83 - 117)  BP: 144/62 (06 Jan 2018 07:00) (89/67 - 164/108)  BP(mean): 81 (06 Jan 2018 07:00) (48 - 121)  ABP: --  ABP(mean): --  RR: 11 (06 Jan 2018 07:00) (7 - 31)  SpO2: 96% (06 Jan 2018 07:00) (92% - 100%)        01-05 @ 07:01  -  01-06 @ 07:00  --------------------------------------------------------  IN: 825 mL / OUT: 3445 mL / NET: -2620 mL      CAPILLARY BLOOD GLUCOSE      POCT Blood Glucose.: 144 mg/dL (06 Jan 2018 05:15)      PHYSICAL EXAM:    General: jaundiced  HEENT: NC/AT  Neck: Supple   Respiratory: CTAB; No wheeze  Cardiovascular: RRR; +S1/S2  Abdomen: +BS, Soft, tender, No rigidity  Extremities: No peripheral edema  Skin: Warm and dry  Neurological: awake, follows commands, speech is incomprehensible, occasional words, moving all limbs   Psychiatry: Unable to assess    MEDICATIONS:  MEDICATIONS  (STANDING):  chlorhexidine 4% Liquid 1 Application(s) Topical daily  dextrose 5%. 1000 milliLiter(s) (50 mL/Hr) IV Continuous <Continuous>  dextrose 50% Injectable 12.5 Gram(s) IV Push once  dextrose 50% Injectable 25 Gram(s) IV Push once  dextrose 50% Injectable 25 Gram(s) IV Push once  influenza   Vaccine 0.5 milliLiter(s) IntraMuscular once  insulin lispro (HumaLOG) corrective regimen sliding scale   SubCutaneous every 6 hours    MEDICATIONS  (PRN):  dextrose Gel 1 Dose(s) Oral once PRN Blood Glucose LESS THAN 70 milliGRAM(s)/deciliter  glucagon  Injectable 1 milliGRAM(s) IntraMuscular once PRN Glucose LESS THAN 70 milligrams/deciliter      ALLERGIES:  Allergies    No Known Allergies    Intolerances        LABS:                        17.7   22.36 )-----------( 50       ( 06 Jan 2018 03:45 )             51.9     01-06    159<H>  |  121<H>  |  68<H>  ----------------------------<  162<H>  4.0   |  26  |  1.21    Ca    9.6      06 Jan 2018 03:45  Phos  2.5     01-06  Mg     2.2     01-06    TPro  5.5<L>  /  Alb  3.1<L>  /  TBili  13.7<H>  /  DBili  x   /  AST  271<H>  /  ALT  696<H>  /  AlkPhos  58  01-06    PT/INR - ( 06 Jan 2018 03:45 )   PT: 19.5 SEC;   INR: 1.74          PTT - ( 06 Jan 2018 03:45 )  PTT:35.6 SEC      RADIOLOGY & ADDITIONAL TESTS: Reviewed.

## 2018-01-06 NOTE — SWALLOW BEDSIDE ASSESSMENT ADULT - ASR SWALLOW ASPIRATION MONITOR
cough/pneumonia/upper respiratory infection/change of breathing pattern/fever/throat clearing/gurgly voice
change of breathing pattern/position upright (90Y)/cough/gurgly voice/fever/oral hygiene/throat clearing/pneumonia/upper respiratory infection

## 2018-01-06 NOTE — SWALLOW BEDSIDE ASSESSMENT ADULT - SPECIFY REASON(S)
to assess swallow function; to determine tolerance for PO to reassess swallow function and determine tolerance for PO

## 2018-01-06 NOTE — SWALLOW BEDSIDE ASSESSMENT ADULT - COMMENTS
Patient is a 76 y/o male admitted for acute L cerebellar infarct with hemorrhagic transformation on MRI, with hospital course complicated by encephalopathy likely 2/2 acute liver failure. Patient was seen for an initial clinical evaluation of swallow function 1/2/18; please see chart for full report.    Patient received awake and appearing internally distracted with fleeting eye contact noted. Patient did not respond to low-level directives and basic y/n questions despite cueing.
Chest x-ray on 12/31:  "Blunted posterior costophrenic angles on the lateral view suggesting small pleural reactions. Bibasilar strand-like opacities compatible with subsegmental atelectatic changes or scarring. Clear remaining visualized lungs. No pneumothorax.Generalized mild osteopenia and mild spinal degenerative changes. Enlarged appearing cardiac and mediastinal silhouettes. Trachea midline. Unremarkable osseous structures."    Patient seen in bed, repositioned by SLP and PCA, seated upright in bed, alert, minimal verbalizations however answered questions with one word responses.  Patient provided with puree, honey thickened liquids and nectar thickened liquid trials.   Patient was cleared by nursing to complete evaluation, as patient is NPO awaiting further recommendations from speech department.

## 2018-01-06 NOTE — PROGRESS NOTE ADULT - ASSESSMENT
ASSESSMENT AND PLAN:  Patient is a 76 y/o M w/ a PMHx of T2DM, HTN, HLD, and remote CVA (2012) with no residual weakness who was initially admitted for ACS r/o and workup of an acute L cerebellar infarct with hospital course c/b encephalopathy likely 2/2 acute liver failure.    # Neuro  - Patient found to have an acute L cerebellar infarct this admission. Of note, Brain MRI also showed new areas of patchy internal hemorrhagic transformation. Patient was acutely encephalopathic 1/3. Code stroke called, Head CT was unchanged from prior. Encephalopathy is likely 2/2 acute liver failure given worsening transaminitis and coagulopathy. Ammonia was checked and was WNL. Will closely monitor for improvement in mental status.  Neurology have recommended 3 months of ASA and plavix, then ASA alone, and atorvastatin once liver enzymes and coagulation normalizes.  - TTE pending    # Pulm  - Patient is currently saturating well on 2L NC.    # CV  - Patient has remained HDS off pressors. He was found to have + Lidia on admission which was suspected to be related to patient's acute CVA per Cardiology. On POCUS 1/3 patient's R heart function appears to be poor. Concern that patient may have poor forward flow resulting in congestive hepatopathy leading to his significant transaminitis. Started on Dobutamine. Patient w/ elevated Troponins in the setting of TATO. CK/CK-MB negative. EKG/R-sided EKG with no apparent acute ST changes. Of note, pro-BNP was found to be 74843 on 1/2.  - dobutamine now ceased due to SVT  - monitor LFTs    # GI  - Patient with acute liver failure possibly 2/2 congestive hepatopathy as noted above. Patient has not been acutely hypotensive this hospitalization. No new concerning medications. Hepatitis panel currently negative. Abdominal U/S with no concerning findings (hepatic vein was patent). Tox screen was negative.  - GI following, appreciate recs. Will c/w autoimmune/infectious workup. Will monitor LFTs and coags.  - ko tube placed twice and pt removed both times.    # Renal  - Patient with TATO possibly 2/2 cardiorenal due to poor flow? creatinine now improving.  - Strict Is/Os. Monitor BMP    # ID  - Patient with a leukocytosis which has grossly remained stable. He is currently afebrile. He is without signs or symptoms of infection. Acute hepatitis panel currently negative. Will monitor off antibiotics.  - Monitor CBC and fever curve    # Endo  - Patient with history of T2DM. Hold home oral diabetes medications  - C/w HISS. Monitor FS    # Heme  - worsening thrombocytopenia, hold heparin and send HIT test    # DVT PPx  - HSQ held due to thrombocytopenia.      Chris BEAULIEU  Internal Medicine Intern  page [phone] ASSESSMENT AND PLAN:  Patient is a 78 y/o M w/ a PMHx of T2DM, HTN, HLD, and remote CVA (2012) with no residual weakness who was initially admitted for ACS r/o and workup of an acute L cerebellar infarct with hospital course c/b encephalopathy likely 2/2 acute liver failure.    # Neuro  - Patient found to have an acute L cerebellar infarct this admission. Of note, Brain MRI also showed new areas of patchy internal hemorrhagic transformation. Patient was acutely encephalopathic 1/3. Code stroke called, Head CT was unchanged from prior. Encephalopathy is likely 2/2 acute liver failure given worsening transaminitis and coagulopathy. Ammonia was checked and was WNL. Will closely monitor for improvement in mental status.  Neurology have recommended 3 months of ASA and plavix, then ASA alone, and atorvastatin once liver enzymes and coagulation normalizes.  - Plan to give lactulose once pt safe to swallow or ng tube placed and not pulled out    # Pulm  - No current issues    # CV  - Patient has remained HDS off pressors. He was found to have + Lidia on admission which was suspected to be related to patient's acute CVA per Cardiology. On POCUS 1/3 patient's R heart function appears to be poor. Concern that patient may have poor forward flow resulting in congestive hepatopathy leading to his significant transaminitis. Started on Dobutamine. Patient w/ elevated Troponins in the setting of TATO. CK/CK-MB negative. EKG/R-sided EKG with no apparent acute ST changes. Of note, pro-BNP was found to be 72765 on 1/2.  - dobutamine now ceased due to SVT  - monitor LFTs  - TTE showed severe global left ventricular systolic dysfunction and severe diastolic dysfunction.    # GI  - Patient with acute liver failure possibly 2/2 congestive hepatopathy as noted above. Patient has not been acutely hypotensive this hospitalization. No new concerning medications. Hepatitis panel currently negative. Abdominal U/S with no concerning findings (hepatic vein was patent). Tox screen was negative.  - GI following, appreciate recs. Will c/w autoimmune/infectious workup. Will monitor LFTs and coags.  - ko tube placed twice and pt removed both times.    # Renal  - Patient with TATO possibly 2/2 cardiorenal due to poor flow? Creatinine now improving.  - Strict Is/Os. Monitor BMP  - Sodium remains elevated in setting of poor oral intake, D5W 50mL/hr and monitor Q8H today.    # ID  - Patient with a leukocytosis which has grossly remained stable. He is currently afebrile. He is without signs or symptoms of infection. Acute hepatitis panel currently negative. Will monitor off antibiotics.  - Monitor CBC and fever curve    # Endo  - Patient with history of T2DM. Hold home oral diabetes medications  - C/w HISS. Monitor FS    # Heme  - worsening thrombocytopenia, hold heparin with platelets under 50  - HIT screen negative    # DVT PPx  - HSQ held due to thrombocytopenia.      Chris BRANNONBS  Internal Medicine Intern  page [phone]

## 2018-01-06 NOTE — SWALLOW BEDSIDE ASSESSMENT ADULT - SWALLOW EVAL: RECOMMENDED DIET
initiate oral diet of dysphagia 1 and honey thickened liquids
Consider non-oral nutrition/hydration/medication; Refer to MD for GOC discussion with patient's family.

## 2018-01-06 NOTE — SWALLOW BEDSIDE ASSESSMENT ADULT - SWALLOW EVAL: CRITERIA FOR SKILLED INTERVENTION MET
not appropriate for swallowing intervention
demonstrates skilled criteria for swallowing intervention

## 2018-01-06 NOTE — SWALLOW BEDSIDE ASSESSMENT ADULT - ORAL PREPARATORY PHASE
Decreased recognition to task; Inadequate labial strippage from utensil; Poor bolus formation; Pooling in lateral sulci.
Within functional limits

## 2018-01-06 NOTE — SWALLOW BEDSIDE ASSESSMENT ADULT - SWALLOW EVAL: DIAGNOSIS
The patient demonstrates severe oropharyngeal dysphagia associated with decreased mental status and reduced recognition to the feeding task resulting in decreased orientation to spoon/cup, inadequate labial stripping of the bolus, and minimal to no lingual motion resulting in poor bolus control with pooling in the anterior sulci, absent posterior transport and absent pharyngeal trigger, thus requiring clinician to manually remove bolus from the oral cavity. Oral nutrition/hydration deemed contraindicated at this time. The patient demonstrates severe oropharyngeal dysphagia associated with decreased mental status and reduced recognition to the feeding task resulting in decreased orientation to spoon/cup, inadequate labial stripping of the bolus, and minimal to no lingual motion resulting in poor bolus control with pooling in the anterior sulci, absent posterior transport and absent pharyngeal trigger despite multimodality cueing including laryngeal massage, thus requiring clinician to manually remove bolus from the oral cavity. Oral nutrition/hydration deemed contraindicated at this time.

## 2018-01-07 NOTE — PROGRESS NOTE ADULT - ASSESSMENT
ASSESSMENT AND PLAN:  Patient is a 76 y/o M w/ a PMHx of T2DM, HTN, HLD, and remote CVA (2012) with no residual weakness who was initially admitted for ACS r/o and workup of an acute L cerebellar infarct with hospital course c/b encephalopathy likely 2/2 acute liver failure.    # Neuro  - Patient found to have an acute L cerebellar infarct this admission. Of note, Brain MRI also showed new areas of patchy internal hemorrhagic transformation. Patient was acutely encephalopathic 1/3. Code stroke called, Head CT was unchanged from prior. Encephalopathy is likely 2/2 acute liver failure given worsening transaminitis and coagulopathy. Ammonia was checked and was WNL. Will closely monitor for improvement in mental status.  Neurology have recommended 3 months of ASA and plavix, then ASA alone, and atorvastatin once liver enzymes and coagulation normalizes.  - Plan to give lactulose once pt safe to swallow or ng tube placed and not pulled out    # Pulm  - No current issues    # CV  - Patient has remained HDS off pressors. He was found to have + Lidia on admission which was suspected to be related to patient's acute CVA per Cardiology. On POCUS 1/3 patient's R heart function appears to be poor. Concern that patient may have poor forward flow resulting in congestive hepatopathy leading to his significant transaminitis. Started on Dobutamine. Patient w/ elevated Troponins in the setting of TATO. CK/CK-MB negative. EKG/R-sided EKG with no apparent acute ST changes. Of note, pro-BNP was [place] 50569 on 1/2.  - dobutamine now ceased due to SVT  - monitor LFTs  - TTE showed severe global left ventricular systolic dysfunction and severe diastolic dysfunction.    # GI  - Patient with acute liver failure possibly 2/2 congestive hepatopathy as noted above. Patient has not been acutely hypotensive this hospitalization. No new concerning medications. Hepatitis panel currently negative. Abdominal U/S with no concerning findings (hepatic vein was patent). Tox screen was negative.  - GI following, appreciate recs. Will c/w autoimmune/infectious workup. Will monitor LFTs and coags.  - ko tube placed twice and pt removed both times.    # Renal  - Patient with TATO possibly 2/2 cardiorenal due to poor flow? Creatinine now improving.  - Strict Is/Os. Monitor BMP  - Sodium remains elevated in setting of poor oral intake, D5W 50mL/hr and monitor Q8H today.    # ID  - Patient with a leukocytosis which has grossly remained stable. He is currently afebrile. He is without signs or symptoms of infection. Acute hepatitis panel currently negative. Will monitor off antibiotics.  - Monitor CBC and fever curve    # Endo  - Patient with history of T2DM. Hold home oral diabetes medications  - C/w HISS. Monitor FS    # Heme  - worsening thrombocytopenia, hold heparin with platelets under 50  - HIT screen negative    # DVT PPx  - HSQ held due to thrombocytopenia.      Chris BRANNONBS  Internal Medicine Intern  page [phone] ASSESSMENT AND PLAN:  Patient is a 76 y/o M w/ a PMHx of T2DM, HTN, HLD, and remote CVA (2012) with no residual weakness who was initially admitted for ACS r/o and workup of an acute L cerebellar infarct with hospital course c/b encephalopathy likely 2/2 acute liver failure.    # Neuro  - Patient found to have an acute L cerebellar infarct this admission. Of note, Brain MRI also showed new areas of patchy internal hemorrhagic transformation. Patient was acutely encephalopathic 1/3. Code stroke called, Head CT was unchanged from prior. Encephalopathy is likely 2/2 acute liver failure given worsening transaminitis and coagulopathy. Ammonia was checked and was WNL. Will closely monitor for improvement in mental status.  Neurology have recommended 3 months of ASA and plavix, then ASA alone, and atorvastatin once liver enzymes and coagulation normalizes.  - continue lactulose  - repeat CT    # Pulm  - No current issues    # CV  - Patient has remained HDS off pressors. He was found to have + Lidia on admission which was suspected to be related to patient's acute CVA per Cardiology. On POCUS 1/3 patient's R heart function appears to be poor. Concern that patient may have poor forward flow resulting in congestive hepatopathy leading to his significant transaminitis. Dobutamine was used to improve perfusion, ceased due to SVT.  - monitor LFTs  - TTE showed severe global left ventricular systolic dysfunction and severe diastolic dysfunction.    # GI  - Patient with acute liver failure possibly 2/2 congestive hepatopathy as noted above. Patient has not been acutely hypotensive this hospitalization. No new concerning medications. Hepatitis panel currently negative. Abdominal U/S with no concerning findings (hepatic vein was patent). Tox screen was negative.  - GI following, appreciate recs. Autoimmune/infectious workup negative. Will monitor LFTs and coags.    # Renal  - Patient with TATO possibly 2/2 cardiorenal due to poor flow? Creatinine now improving.  - Strict Is/Os. Monitor BMP  - Sodium remains elevated in setting of poor oral intake, now improving with free water boluses.    # ID  - Patient with a leukocytosis which has grossly remained stable. He is currently afebrile. He is without signs or symptoms of infection. Acute hepatitis panel currently negative. Will monitor off antibiotics.  - Monitor CBC and fever curve    # Endo  - Patient with history of T2DM. Hold home oral diabetes medications  - C/w HISS. Monitor FS    # Heme  - worsening thrombocytopenia, hold heparin with platelets under 50  - HIT screen negative    # DVT PPx  - HSQ held due to thrombocytopenia.      Chris BRANNONBS  Internal Medicine Intern  page [phone]

## 2018-01-07 NOTE — PROGRESS NOTE ADULT - SUBJECTIVE AND OBJECTIVE BOX
INTERVAL HPI/OVERNIGHT EVENTS:    SUBJECTIVE: Patient seen and examined at bedside.     CONSTITUTIONAL: No weakness, fevers or chills  EYES/ENT: No visual changes;  No vertigo or throat pain   NECK: No pain or stiffness  RESPIRATORY: No cough, wheezing, hemoptysis; No shortness of breath  CARDIOVASCULAR: No chest pain or palpitations  GASTROINTESTINAL: No abdominal or epigastric pain. No nausea, vomiting, or hematemesis; No diarrhea or constipation. No melena or hematochezia.  GENITOURINARY: No dysuria, frequency or hematuria  NEUROLOGICAL: No numbness or weakness  SKIN: No itching, rashes    OBJECTIVE:    VITAL SIGNS:  ICU Vital Signs Last 24 Hrs  T(C): 35.8 (07 Jan 2018 04:00), Max: 36.7 (06 Jan 2018 16:00)  T(F): 96.5 (07 Jan 2018 04:00), Max: 98.1 (06 Jan 2018 16:00)  HR: 99 (07 Jan 2018 07:00) (92 - 128)  BP: 129/108 (07 Jan 2018 07:00) (98/57 - 183/49)  BP(mean): 112 (07 Jan 2018 07:00) (65 - 124)  ABP: --  ABP(mean): --  RR: 31 (07 Jan 2018 07:00) (12 - 31)  SpO2: 96% (07 Jan 2018 07:00) (91% - 100%)        01-06 @ 07:01  -  01-07 @ 07:00  --------------------------------------------------------  IN: 1675 mL / OUT: 1290 mL / NET: 385 mL      CAPILLARY BLOOD GLUCOSE      POCT Blood Glucose.: 208 mg/dL (07 Jan 2018 05:02)      PHYSICAL EXAM:    General: jaundiced  HEENT: NC/AT  Neck: Supple   Respiratory: CTAB; No wheeze  Cardiovascular: RRR; +S1/S2  Abdomen: +BS, Soft, tender, No rigidity  Extremities: No peripheral edema  Skin: Warm and dry  Neurological: awake, follows commands, speech is incomprehensible, occasional words, moving all limbs   Psychiatry: Unable to assess    MEDICATIONS:  MEDICATIONS  (STANDING):  chlorhexidine 4% Liquid 1 Application(s) Topical daily  dextrose 5%. 1000 milliLiter(s) (75 mL/Hr) IV Continuous <Continuous>  dextrose 5%. 1000 milliLiter(s) (50 mL/Hr) IV Continuous <Continuous>  dextrose 50% Injectable 12.5 Gram(s) IV Push once  dextrose 50% Injectable 25 Gram(s) IV Push once  dextrose 50% Injectable 25 Gram(s) IV Push once  enalapril 2.5 milliGRAM(s) Oral daily  influenza   Vaccine 0.5 milliLiter(s) IntraMuscular once  insulin lispro (HumaLOG) corrective regimen sliding scale   SubCutaneous every 6 hours  lactulose Syrup 20 Gram(s) Oral three times a day  metoprolol     tartrate 25 milliGRAM(s) Oral two times a day    MEDICATIONS  (PRN):  dextrose Gel 1 Dose(s) Oral once PRN Blood Glucose LESS THAN 70 milliGRAM(s)/deciliter  glucagon  Injectable 1 milliGRAM(s) IntraMuscular once PRN Glucose LESS THAN 70 milligrams/deciliter  sodium chloride 0.65% Nasal 1 Spray(s) Both Nostrils three times a day PRN Nasal Congestion  sodium chloride 3%  Inhalation 3 milliLiter(s) Inhalation four times a day PRN dry mouth and throat      ALLERGIES:  Allergies    No Known Allergies    Intolerances        LABS:                        16.4   22.43 )-----------( 52       ( 07 Jan 2018 02:30 )             49.3     01-07    162<HH>  |  126<H>  |  62<H>  ----------------------------<  277<H>  4.6   |  21<L>  |  1.18    Ca    9.4      07 Jan 2018 02:30  Phos  2.5     01-07  Mg     2.2     01-07    TPro  5.3<L>  /  Alb  2.8<L>  /  TBili  17.5<H>  /  DBili  x   /  AST  230<H>  /  ALT  568<H>  /  AlkPhos  60  01-07    PT/INR - ( 07 Jan 2018 02:30 )   PT: 18.4 SEC;   INR: 1.59          PTT - ( 07 Jan 2018 02:30 )  PTT:31.7 SEC      RADIOLOGY & ADDITIONAL TESTS: Reviewed. INTERVAL HPI/OVERNIGHT EVENTS: ng tube placed, free water and lactulose started, dextrose increased to 75cc/hr    SUBJECTIVE: Patient seen and examined at bedside.   pt remains confused, not making eye contact, not following commands    OBJECTIVE:    VITAL SIGNS:  ICU Vital Signs Last 24 Hrs  T(C): 35.8 (07 Jan 2018 04:00), Max: 36.7 (06 Jan 2018 16:00)  T(F): 96.5 (07 Jan 2018 04:00), Max: 98.1 (06 Jan 2018 16:00)  HR: 99 (07 Jan 2018 07:00) (92 - 128)  BP: 129/108 (07 Jan 2018 07:00) (98/57 - 183/49)  BP(mean): 112 (07 Jan 2018 07:00) (65 - 124)  ABP: --  ABP(mean): --  RR: 31 (07 Jan 2018 07:00) (12 - 31)  SpO2: 96% (07 Jan 2018 07:00) (91% - 100%)        01-06 @ 07:01  -  01-07 @ 07:00  --------------------------------------------------------  IN: 1675 mL / OUT: 1290 mL / NET: 385 mL      CAPILLARY BLOOD GLUCOSE      POCT Blood Glucose.: 208 mg/dL (07 Jan 2018 05:02)      PHYSICAL EXAM:    General: jaundiced  HEENT: NC/AT  Neck: Supple   Respiratory: CTAB; No wheeze  Cardiovascular: RRR; +S1/S2  Abdomen: +BS, Soft, tender, No rigidity  Extremities: No peripheral edema  Skin: Warm and dry  Neurological: awake, no longer making eye contact, not follows commands, speech is incomprehensible, moving all limbs   Psychiatry: Unable to assess    MEDICATIONS:  MEDICATIONS  (STANDING):  chlorhexidine 4% Liquid 1 Application(s) Topical daily  dextrose 5%. 1000 milliLiter(s) (75 mL/Hr) IV Continuous <Continuous>  dextrose 5%. 1000 milliLiter(s) (50 mL/Hr) IV Continuous <Continuous>  dextrose 50% Injectable 12.5 Gram(s) IV Push once  dextrose 50% Injectable 25 Gram(s) IV Push once  dextrose 50% Injectable 25 Gram(s) IV Push once  enalapril 2.5 milliGRAM(s) Oral daily  influenza   Vaccine 0.5 milliLiter(s) IntraMuscular once  insulin lispro (HumaLOG) corrective regimen sliding scale   SubCutaneous every 6 hours  lactulose Syrup 20 Gram(s) Oral three times a day  metoprolol     tartrate 25 milliGRAM(s) Oral two times a day    MEDICATIONS  (PRN):  dextrose Gel 1 Dose(s) Oral once PRN Blood Glucose LESS THAN 70 milliGRAM(s)/deciliter  glucagon  Injectable 1 milliGRAM(s) IntraMuscular once PRN Glucose LESS THAN 70 milligrams/deciliter  sodium chloride 0.65% Nasal 1 Spray(s) Both Nostrils three times a day PRN Nasal Congestion  sodium chloride 3%  Inhalation 3 milliLiter(s) Inhalation four times a day PRN dry mouth and throat      ALLERGIES:  Allergies    No Known Allergies    Intolerances        LABS:                        16.4   22.43 )-----------( 52       ( 07 Jan 2018 02:30 )             49.3     01-07    162<HH>  |  126<H>  |  62<H>  ----------------------------<  277<H>  4.6   |  21<L>  |  1.18    Ca    9.4      07 Jan 2018 02:30  Phos  2.5     01-07  Mg     2.2     01-07    TPro  5.3<L>  /  Alb  2.8<L>  /  TBili  17.5<H>  /  DBili  x   /  AST  230<H>  /  ALT  568<H>  /  AlkPhos  60  01-07    PT/INR - ( 07 Jan 2018 02:30 )   PT: 18.4 SEC;   INR: 1.59          PTT - ( 07 Jan 2018 02:30 )  PTT:31.7 SEC      RADIOLOGY & ADDITIONAL TESTS: Reviewed.

## 2018-01-08 NOTE — CHART NOTE - NSCHARTNOTEFT_GEN_A_CORE
: Cornel Wakefield MD    INDICATION: AMS    PROCEDURE:  [x ] LIMITED ECHO  [ x] LIMITED CHEST  [ ] LIMITED RETROPERITONEAL  [ ] LIMITED ABDOMINAL  [ ] LIMITED DVT  [ ] NEEDLE GUIDANCE VASCULAR  [ ] NEEDLE GUIDANCE THORACENTESIS  [ ] NEEDLE GUIDANCE PARACENTESIS  [ ] NEEDLE GUIDANCE PERICARDIOCENTESIS  [ ] OTHER    FINDINGS: A lines anteriorly, B/L effusions posteriorly with small LLL consolidation  Severe LV dysfunction.      INTERPRETATION: Severe LV dysfunction

## 2018-01-08 NOTE — PROCEDURE NOTE - NSPROCDETAILS_GEN_ALL_CORE
location identified, feeding tube inserted
patient pre-oxygenated, tube inserted, placement confirmed

## 2018-01-08 NOTE — PROGRESS NOTE ADULT - SUBJECTIVE AND OBJECTIVE BOX
INTERVAL HPI/OVERNIGHT EVENTS:  arrest x 2    SUBJECTIVE: Patient seen and examined at bedside.     CONSTITUTIONAL: No weakness, fevers or chills  EYES/ENT: No visual changes;  No vertigo or throat pain   NECK: No pain or stiffness  RESPIRATORY: No cough, wheezing, hemoptysis; No shortness of breath  CARDIOVASCULAR: No chest pain or palpitations  GASTROINTESTINAL: No abdominal or epigastric pain. No nausea, vomiting, or hematemesis; No diarrhea or constipation. No melena or hematochezia.  GENITOURINARY: No dysuria, frequency or hematuria  NEUROLOGICAL: No numbness or weakness  SKIN: No itching, rashes    OBJECTIVE:    VITAL SIGNS:  ICU Vital Signs Last 24 Hrs  T(C): 36 (2018 04:00), Max: 36.3 (2018 12:00)  T(F): 96.8 (2018 04:00), Max: 97.3 (2018 12:00)  HR: 100 (:00) (37 - 135)  BP: 93/58 (:00) (77/15 - 177/141)  BP(mean): 66 (2018 07:00) (25 - 150)  ABP: --  ABP(mean): --  RR: 21 (2018 07:00) (0 - 37)  SpO2: 100% (2018 07:00) (90% - 100%)    Mode: AC/ CMV (Assist Control/ Continuous Mandatory Ventilation), RR (machine): 14, TV (machine): 450, FiO2: 50, PEEP: 5, MAP: 9, PIP: 17    - @ 07:01  -  - @ 07:00  --------------------------------------------------------  IN: 2037 mL / OUT: 910 mL / NET: 1127 mL      CAPILLARY BLOOD GLUCOSE      POCT Blood Glucose.: 262 mg/dL (2018 06:51)      PHYSICAL EXAM:    General: jaundiced - intubated, sedated  HEENT: NC/AT  Neck: Supple   Respiratory: CTAB; No wheeze  Cardiovascular: RRR; +S1/S2  Abdomen: +BS, Soft, tender, No rigidity  Extremities: No peripheral edema  Skin: Warm and dry  Neurological: awake, no longer making eye contact, not follows commands, speech is incomprehensible, moving all limbs   Psychiatry: Unable to assess      MEDICATIONS:  MEDICATIONS  (STANDING):  chlorhexidine 0.12% Liquid 15 milliLiter(s) Swish and Spit two times a day  chlorhexidine 4% Liquid 1 Application(s) Topical two times a day  chlorhexidine 4% Liquid 1 Application(s) Topical daily  dextrose 5%. 1000 milliLiter(s) (50 mL/Hr) IV Continuous <Continuous>  dextrose 50% Injectable 12.5 Gram(s) IV Push once  dextrose 50% Injectable 25 Gram(s) IV Push once  dextrose 50% Injectable 25 Gram(s) IV Push once  influenza   Vaccine 0.5 milliLiter(s) IntraMuscular once  insulin lispro (HumaLOG) corrective regimen sliding scale   SubCutaneous every 6 hours  lactulose Syrup 20 Gram(s) Oral three times a day  norepinephrine Infusion 0.04 MICROgram(s)/kG/Min (6.15 mL/Hr) IV Continuous <Continuous>  petrolatum Ophthalmic Ointment 1 Application(s) Both EYES two times a day  propofol Infusion 20 MICROgram(s)/kG/Min (9.84 mL/Hr) IV Continuous <Continuous>  thiamine 100 milliGRAM(s) Oral daily    MEDICATIONS  (PRN):  dextrose Gel 1 Dose(s) Oral once PRN Blood Glucose LESS THAN 70 milliGRAM(s)/deciliter  glucagon  Injectable 1 milliGRAM(s) IntraMuscular once PRN Glucose LESS THAN 70 milligrams/deciliter  sodium chloride 0.65% Nasal 1 Spray(s) Both Nostrils three times a day PRN Nasal Congestion  sodium chloride 3%  Inhalation 3 milliLiter(s) Inhalation four times a day PRN dry mouth and throat      ALLERGIES:  Allergies    No Known Allergies    Intolerances        LABS:                        14.9   21.13 )-----------( 32       ( 2018 00:00 )             47.9     01-08    166<HH>  |  120<H>  |  59<H>  ----------------------------<  316<H>  4.3   |  18<L>  |  1.41<H>    Ca    8.4      2018 00:00  Phos  9.9     -08  Mg     2.5     -08    TPro  4.7<L>  /  Alb  2.6<L>  /  TBili  18.0<H>  /  DBili  x   /  AST  167<H>  /  ALT  377<H>  /  AlkPhos  73  01-08    PT/INR - ( 2018 00:00 )   PT: 19.5 SEC;   INR: 1.68          PTT - ( 2018 00:00 )  PTT:46.6 SEC  Urinalysis Basic - ( 2018 14:15 )    Color: YELLOW / Appearance: CLEAR / S.020 / pH: 5.5  Gluc: >1000 / Ketone: NEGATIVE  / Bili: MODERATE / Urobili: 1 mg/dL   Blood: MODERATE / Protein: 100 mg/dL / Nitrite: NEGATIVE   Leuk Esterase: NEGATIVE / RBC: 10-25 / WBC 2-5   Sq Epi: x / Non Sq Epi: x / Bacteria: x        RADIOLOGY & ADDITIONAL TESTS: Reviewed. INTERVAL HPI/OVERNIGHT EVENTS:  PEA arrest overnight x 2    SUBJECTIVE: Patient seen and examined at bedside.     REVIEW OF SYSTEMS: unable to complete as pt intubated and sedated    OBJECTIVE:    VITAL SIGNS:  ICU Vital Signs Last 24 Hrs  T(C): 36 (2018 04:00), Max: 36.3 (2018 12:00)  T(F): 96.8 (2018 04:00), Max: 97.3 (2018 12:00)  HR: 100 (2018 07:00) (37 - 135)  BP: 93/58 (2018 07:00) (77/15 - 177/141)  BP(mean): 66 (2018 07:00) (25 - 150)  ABP: --  ABP(mean): --  RR: 21 (2018 07:00) (0 - 37)  SpO2: 100% (:00) (90% - 100%)    Mode: AC/ CMV (Assist Control/ Continuous Mandatory Ventilation), RR (machine): 14, TV (machine): 450, FiO2: 50, PEEP: 5, MAP: 9, PIP: 17     @ 07:01  -  -08 @ 07:00  --------------------------------------------------------  IN: 2037 mL / OUT: 910 mL / NET: 1127 mL      CAPILLARY BLOOD GLUCOSE      POCT Blood Glucose.: 262 mg/dL (2018 06:51)      PHYSICAL EXAM:    General: jaundiced - intubated, sedated  HEENT: NC/AT  Neck: Supple   Respiratory: CTAB; No wheeze  Cardiovascular: RRR; +S1/S2  Abdomen: +BS, Soft, tender, No rigidity  Extremities: No peripheral edema  Skin: Warm and dry  Neurological: pupils mid dilated and not reactive, no response to noxious stimulus   Psychiatry: Unable to assess      MEDICATIONS:  MEDICATIONS  (STANDING):  chlorhexidine 0.12% Liquid 15 milliLiter(s) Swish and Spit two times a day  chlorhexidine 4% Liquid 1 Application(s) Topical two times a day  chlorhexidine 4% Liquid 1 Application(s) Topical daily  dextrose 5%. 1000 milliLiter(s) (50 mL/Hr) IV Continuous <Continuous>  dextrose 50% Injectable 12.5 Gram(s) IV Push once  dextrose 50% Injectable 25 Gram(s) IV Push once  dextrose 50% Injectable 25 Gram(s) IV Push once  influenza   Vaccine 0.5 milliLiter(s) IntraMuscular once  insulin lispro (HumaLOG) corrective regimen sliding scale   SubCutaneous every 6 hours  lactulose Syrup 20 Gram(s) Oral three times a day  norepinephrine Infusion 0.04 MICROgram(s)/kG/Min (6.15 mL/Hr) IV Continuous <Continuous>  petrolatum Ophthalmic Ointment 1 Application(s) Both EYES two times a day  propofol Infusion 20 MICROgram(s)/kG/Min (9.84 mL/Hr) IV Continuous <Continuous>  thiamine 100 milliGRAM(s) Oral daily    MEDICATIONS  (PRN):  dextrose Gel 1 Dose(s) Oral once PRN Blood Glucose LESS THAN 70 milliGRAM(s)/deciliter  glucagon  Injectable 1 milliGRAM(s) IntraMuscular once PRN Glucose LESS THAN 70 milligrams/deciliter  sodium chloride 0.65% Nasal 1 Spray(s) Both Nostrils three times a day PRN Nasal Congestion  sodium chloride 3%  Inhalation 3 milliLiter(s) Inhalation four times a day PRN dry mouth and throat      ALLERGIES:  Allergies    No Known Allergies    Intolerances        LABS:                        14.9   21.13 )-----------( 32       ( 2018 00:00 )             47.9     01-08    166<HH>  |  120<H>  |  59<H>  ----------------------------<  316<H>  4.3   |  18<L>  |  1.41<H>    Ca    8.4      2018 00:00  Phos  9.9     01-08  Mg     2.5     01-08    TPro  4.7<L>  /  Alb  2.6<L>  /  TBili  18.0<H>  /  DBili  x   /  AST  167<H>  /  ALT  377<H>  /  AlkPhos  73  01-08    PT/INR - ( 2018 00:00 )   PT: 19.5 SEC;   INR: 1.68          PTT - ( 2018 00:00 )  PTT:46.6 SEC  Urinalysis Basic - ( 2018 14:15 )    Color: YELLOW / Appearance: CLEAR / S.020 / pH: 5.5  Gluc: >1000 / Ketone: NEGATIVE  / Bili: MODERATE / Urobili: 1 mg/dL   Blood: MODERATE / Protein: 100 mg/dL / Nitrite: NEGATIVE   Leuk Esterase: NEGATIVE / RBC: 10-25 / WBC 2-5   Sq Epi: x / Non Sq Epi: x / Bacteria: x        RADIOLOGY & ADDITIONAL TESTS: Reviewed.

## 2018-01-08 NOTE — PROGRESS NOTE ADULT - PROVIDER SPECIALTY LIST ADULT
Cardiology
Gastroenterology
Gastroenterology
Hospitalist
MICU
Neurology
Neurology
Cardiology
Hospitalist
Internal Medicine

## 2018-01-08 NOTE — CHART NOTE - NSCHARTNOTESELECT_GEN_ALL_CORE
Event Note
Event Note
Event Note/Death Note for Patient
Event Note/MICU
Event Note/POCUS
Event note
MICU Accept Note/Transfer Note
Ultrasound Note

## 2018-01-08 NOTE — CHART NOTE - NSCHARTNOTEFT_GEN_A_CORE
: Cornel Wakefield MD    INDICATION: Respiratory failure. Shock.     PROCEDURE:  [X] LIMITED ECHO  [X] LIMITED CHEST  [ ] LIMITED RETROPERITONEAL  [ ] LIMITED ABDOMINAL  [ ] LIMITED DVT  [ ] NEEDLE GUIDANCE VASCULAR  [ ] NEEDLE GUIDANCE THORACENTESIS  [ ] NEEDLE GUIDANCE PARACENTESIS  [ ] NEEDLE GUIDANCE PERICARDIOCENTESIS  [ ] OTHER    FINDINGS: Bilateral A lines anteriorly. Normal aeration pattern.   Severely reduced LV function seen on 4 chamber and subcostal views. Normal RV size.     INTERPRETATION: Severely reduced LV function.

## 2018-01-08 NOTE — PROGRESS NOTE ADULT - ASSESSMENT
ASSESSMENT AND PLAN:  Patient is a 78 y/o M w/ a PMHx of T2DM, HTN, HLD, and remote CVA (2012) with no residual weakness who was initially admitted for ACS r/o and workup of an acute L cerebellar infarct with hospital course c/b encephalopathy likely 2/2 acute liver failure.    # Neuro  - Patient found to have an acute L cerebellar infarct this admission. Of note, Brain MRI also showed new areas of patchy internal hemorrhagic transformation. Patient was acutely encephalopathic 1/3. Code stroke called, Head CT was unchanged from prior. Encephalopathy is likely 2/2 acute liver failure given worsening transaminitis and coagulopathy. Ammonia was checked and was WNL. Will closely monitor for improvement in mental status.  Neurology have recommended 3 months of ASA and plavix, then ASA alone, and atorvastatin once liver enzymes and coagulation normalizes.  - continue lactulose  - repeat CT    # Pulm  - No current issues    # CV  - Patient has remained HDS off pressors. He was found to have + Lidia on admission which was suspected to be related to patient's acute CVA per Cardiology. On POCUS 1/3 patient's R heart function appears to be poor. Concern that patient may have poor forward flow resulting in congestive hepatopathy leading to his significant transaminitis. Dobutamine was used to improve perfusion, ceased due to SVT.  - monitor LFTs  - TTE showed severe global left ventricular systolic dysfunction and severe diastolic dysfunction.    # GI  - Patient with acute liver failure possibly 2/2 congestive hepatopathy as noted above. Patient has not been acutely hypotensive this hospitalization. No new concerning medications. Hepatitis panel currently negative. Abdominal U/S with no concerning findings (hepatic vein was patent). Tox screen was negative.  - GI following, appreciate recs. Autoimmune/infectious workup negative. Will monitor LFTs and coags.    # Renal  - Patient with TATO possibly 2/2 cardiorenal due to poor flow? Creatinine now improving.  - Strict Is/Os. Monitor BMP  - Sodium remains elevated in setting of poor oral intake, now improving with free water boluses.    # ID  - Patient with a leukocytosis which has grossly remained stable. He is currently afebrile. He is without signs or symptoms of infection. Acute hepatitis panel currently negative. Will monitor off antibiotics.  - Monitor CBC and fever curve    # Endo  - Patient with history of T2DM. Hold home oral diabetes medications  - C/w HISS. Monitor FS    # Heme  - worsening thrombocytopenia, hold heparin with platelets under 50  - HIT screen negative    # DVT PPx  - HSQ held due to thrombocytopenia.      Chris BRANNONBS  Internal Medicine Intern  page [phone] ASSESSMENT AND PLAN:  Patient is a 76 y/o M w/ a PMHx of T2DM, HTN, HLD, and remote CVA (2012) with no residual weakness who was initially admitted for ACS r/o and workup of an acute L cerebellar infarct with hospital course c/b encephalopathy likely 2/2 acute liver failure. Now with PEA arrest overnight and reduced mental status, likely further ischemic brain injury, poor prognosis.    # Neuro  - Patient found to have an acute L) cerebellar infarct this admission. Of note, Brain MRI also showed new areas of patchy internal hemorrhagic transformation. Patient was acutely encephalopathic 1/3. Code stroke called, Head CT was unchanged from prior. Encephalopathy is likely 2/2 acute liver failure given worsening transaminitis and coagulopathy.  - worse neurological state following cardiac arrest overnight  - turn off sedation and monitor mental status    # Pulm  - No current issues    # CV  - Patient has remained HDS off pressors. He was found to have + Lidia on admission which was suspected to be related to patient's acute CVA per Cardiology. On POCUS 1/3 patient's R heart function appears to be poor. Concern that patient may have poor forward flow resulting in congestive hepatopathy leading to his significant transaminitis. Dobutamine was used to improve perfusion, ceased due to SVT.  - monitor LFTs  - TTE showed severe global left ventricular systolic dysfunction and severe diastolic dysfunction.  - PEA arrest x 2 overnight, now HD stable    # GI  - Patient with acute liver failure possibly 2/2 congestive hepatopathy as noted above. Patient has not been acutely hypotensive this hospitalization prior to last nights arrest. No new concerning medications. Hepatitis panel currently negative. Abdominal U/S with no concerning findings (hepatic vein was patent). Tox screen was negative.  - GI following, appreciate recs. Autoimmune/infectious workup negative. Will monitor LFTs and coags.    # Renal  - Patient with TATO possibly 2/2 cardiorenal due to poor flow? Creatinine now improving.  - Strict Is/Os. Monitor BMP  - Sodium remains elevated in setting of poor oral intake, now improving with free water boluses.  - poor urine output since arrest    # ID  - Patient with a leukocytosis which has grossly remained stable. He is currently afebrile. He is without signs or symptoms of infection. Acute hepatitis panel currently negative. Will monitor off antibiotics.  - Monitor CBC and fever curve    # Endo  - Patient with history of T2DM. Hold home oral diabetes medications  - C/w HISS. Monitor FS    # Heme  - worsening thrombocytopenia, hold heparin with platelets under 50  - HIT testing negative    # DVT PPx  - HSQ held due to thrombocytopenia.    # Ethics  - poor prognosis and likely further brain injury discussed with family, understanding of situation and futility of further resuscitation if required, more relatives are travelling to see pt so no withdrawal of care at this stage      Chris BEAULIEU  Internal Medicine Intern  page [phone]

## 2018-01-08 NOTE — CHART NOTE - NSCHARTNOTEFT_GEN_A_CORE
Providers were called to bedside as pt was found to be in distress and desaturating into the 80's. Upon arrival, pt was found to be apneic and desaturating into the 50's. Pt proceeded to lose a pulse and ACLS was started. Pt had ROSC after 10 mins of ACLS, however, pt lost a pulse and underwent 5 more minutes of ACLS. In total, pt received 5 doses of Epi, 6 doses of Bicarb, 1 shock @ 200J on a biphasic Zoll. Pt was intubated using video assisted scope, with positive breathing sounds and a positive color change on colormetric end-tidal CO2 detector. Pt was started on a Levophed drip and placed on a ventilator.    Pt now has HR in the 90's, BP in the 70's/40's, afebrile, satting 100% on ventilator.    Discussion was had about goals of care with family which is documented in a goals of care note. It was explained to the family that the patient has a very guarded prognoses and is unlikely to survive into the morning.      CARISSA Clarke MD-PGY1  Internal Medicine Department  Pager: [phone] / [phone]

## 2018-01-08 NOTE — GOALS OF CARE CONVERSATION - PERSONAL ADVANCE DIRECTIVE - CONVERSATION DETAILS
I spoke to the wife, daughter, and son that patient's heart had stopped and that he required chest compressions, shock, and intubation. They understood that this was a very traumatic process. I discussed that if the time came that the patient's heart would stop again, that we would not proceed with another round of CPR. They understood that the patient's prognosis was guarded and that there was a very low chance that the patient would survive to the morning. The family understood and decided that they wanted to continue with full care until the rest of their family could arrive 4 hours later. I explained that there is a low probability of the patient surviving that long, however, assured them that we would continue with current medications for as long as possible.     Wife agreed that once the rest of the family arrived, pt will be made comfort care.    Pt's prognosis is guarded, the family is aware.

## 2018-01-08 NOTE — CHART NOTE - NSCHARTNOTEFT_GEN_A_CORE
Called to evaluate the patient for unresponsiveness.     On physical exam patient did not respond to verbal or physical stimuli. No spontaneous respirations.  Absent heart and breath sounds. Absent radial, femoral, and carotid pulses.   Pupils are fixed and dilated absent PAPI, no corneal reflex.  EKG rhythm strip shows asystole.   Patient pronounced dead at 16:13. Attending notified.  Family at the bedside. Autopsy declined.    Dennis Delarosa MD PGY2   [facility] Pager #: [phone]  [facility] Pager #: [phone]

## 2018-01-08 NOTE — PROGRESS NOTE ADULT - ATTENDING COMMENTS
Agree with above    Encephalopathy thought to be 2/2 shock liver  May have component 2/2 intracranial events above; await repeat CT brain  30 minutes critical care time in providing care for these problems
Heart failure with reduced EF/shock liver prob congestive hepatopathy/guarded
If RHF is the suspected etiology of acute liver failure, would d/c beta blocker to allow dobutamine to have full effect. Generally though, it doesn't help the RV as much.
cardiac arrest last night unclear etiology, but severe LV dys/liver toxicity/on vent/guarded
Agree with above  Improving mental status possibly 2/2 improving acute liver injury possibly 2/2 acute heart failure in context of CVA  Slowly correct hypernatremia  Hold of feeding
Agree with above. Seen and examined with resident. Improving LFT's and mental status. Off dobutamine because of increased SVT's.
agree with above; ROS otherwise negative
Pt. seen in the morning.  Transaminases > 1000 are most often caused by one of four causes - viral hepatitis, drug/toxin injury, ischemic hepatopathy, and during passage of a gallstone.   His US did not show biliary ductal dilatation and no pain was reported; serologies for hepatitis A, B, C, CMV and EBV are negative. His daughter, with whom he lives, denies that he took any new medications. He most likely had an unrecorded episode of hypotension while here in the hospital. His elevated cardiac markers on admission suggest cardiac injury at home, and he may have had an episode of arrhythmia (again) here in the hospital.

## 2018-01-08 NOTE — DISCHARGE NOTE FOR THE EXPIRED PATIENT - HOSPITAL COURSE
Patient is a 78 y/o M w/ a PMHx of T2DM, HTN, HLD, and remote CVA (2012) with no residual weakness who initially p/w acute onset of chest pain/SOB for 4 days PTA which had resolved. He also p/w dizziness and slurred speech x4 days, and was found to have a L cerebellar acute infarct. Patient was initially admitted for CVA workup and to r/o ACS as patient was found to have elevated Lidia. Cardiology was following who had a low suspicion for ACS and suspected the + Lidia to be related to patient's CVA. Patient's hospital course has been complicated by acute liver failure of unclear etiology. Patient's LFTs significantly increased from 1/1-1/2 (AST/ALT = 89/110 --> 1770/1249) and was found to have evidence of coagulopathy (INR: 1.36-->2.69). Patient's mental status acutely worsened and he was noted to be much more lethargic. Code stroke was called and a repeat head CT was grossly unchanged. Given worsening encephalopathy in the setting of acute liver failure, patient was transferred to the MICU for further management. Acute liver failure was worked up with no toxic or infectious causes identified with GI consulting. The cause was determined to be ischemic liver injury due to the rapid decline and subsequent recovery in liver enzymes. The patients mental state improved gradually as his liver injury recovered. His heart was found to have severe systolic dysfunction and dobutamine was used to improve his liver perfusion - this was ceased when he became tachycardic. His sodium level increased due to his poor oral intake and this was managed with free water IV and po with frequent monitoring. At this stage his outlook was guarded as his mental improvement was slow - his cerebellar stroke accounted for imbalance and falls, but not his degree of impaired awareness and interactivity. It was hoped that with detoxification with lactulose his mental state would improve leaving him with only coordination deficits but it was also possible he suffered more extensive brain injury either from his stroke or ischemic Brain injury. Overnight between January 7th and 8th Mr Jenkins suffered a 2x PEA arrest with return of circulation after 10 minutes and 4 minutes and was subsequently intubated and on pressor support. Following these events his prognosis was now very poor with this period of further Brain hypoperfusion in addition to his stroke and liver failure. His wife and daughter were present through these events and the lack of any benefit of further resuscitation was discussed with the family and they understood that no further resuscitation would be attempted. Through the day on January 8th Mr Jenkins showed no signs of neurological recover without sedation and family agreed to DNR/DNI with further family members present. At 412 pm he became bradycardic and went into PEA arrest.    On review at 413pm with Dr Delarosa pt was found to have no respiratory effort, no heart sounds, no carotid or radial pulses, no electrical activity on cardiac monitoring, and fixed and dilated pupils. Time of death 1613 1/8/2018, RIP.

## 2018-01-08 NOTE — CHART NOTE - NSCHARTNOTEFT_GEN_A_CORE
Patient encounter occurred on 1/7, note signed on 1/8.    Called to the bedside for an episode of resp distress. Patient seen and examined. Patient was noted to be tachypneic followed by a brief episode of apnea. Patient immediately went into a PEA arrest. ACLS was initiated and patient was intubated (see resident's code note). ROSC was achieved after a total of 14 minutes of down time following which levophed was initiated for hypotension. Finger stick was unremarkable. Labs from earlier today showed unremarkable lytes.    On exam, patient is intubated but not sedated  BP 100s systolic, MAP 70 on levophed 0.15 mcg, O2 sat 100% on the vent.    Code labs pending. CTH done earlier today again demonstrated the known cerebellar stroke with hemorrhagic conversion without any midline shift or herniation.    Of note, during intubation brown colored gastric contents were suctioned from the OP.    Impression  1.) Cardiac arrest likely secondary to pulmonary arrest most likely in the setting of an aspiration event.  2.) Acute liver failure secondary to ischemic injury vs congestive hepatopathy in the setting of MI with cardiac dysfunction  3.) TATO possibly secondary HRS type 1 vs ischemic ATN  4.) AMS secondary to metabolic encephalopathy in the setting of above + cerebellar stroke    Plan  1.) Cont routine vent management, titrate FIO2 and PEEP as tolerated  2.) F/U post intubation ABG and CXR.  3.) Keep NPO for now.  4.) Cont levophed, titrate to maintain MAP > 65.  5.) F/U code labs.  6.) Add sedation if needed, goal RASS 0 to -2.  7.) Monitor finger sticks, goal 140-180. Resume free water boluses for hypernatremia now that the patient has an OGT.   8.) Cont further care in the MICU for further management of above.  9.) Full code. Family notified of the recent events and currently at the bedside. They are aware of the patient's critical status. They also understand that he is on maximum life support at this time however, remains at high high risk for another cardiac arrest. They wish to continue all aggressive measures for now as they are waiting for additional family members to arrive from [place] following which they would likely be interested in withdrawing care and pursuing comfort measures. However, they do understand that in the event that the patient has another cardiac arrest he will likely not survive. Prognosis grim.     45 minutes of critical care time exclusive of all procedures. Patient encounter occurred on 1/7, note signed on 1/8.    Called to the bedside for an episode of resp distress. Patient seen and examined. Patient was noted to be tachypneic followed by a brief episode of apnea. Patient immediately went into a PEA arrest. ACLS was initiated and patient was intubated (see resident's code note). ROSC was achieved after a total of 14 minutes of down time following which levophed was initiated for hypotension. Finger stick was unremarkable. Labs from earlier today showed unremarkable lytes.    On exam, patient is intubated but not sedated  BP 100s systolic, MAP 70 on levophed 0.15 mcg, O2 sat 100% on the vent.    Code labs pending. CTH done earlier today again demonstrated the known cerebellar stroke with hemorrhagic conversion without any midline shift or herniation.    Of note, during intubation brown colored gastric contents were suctioned from the OP.    Impression  1.) Cardiac arrest likely secondary to pulmonary arrest most likely in the setting of an aspiration event.  2.) Acute liver failure secondary to ischemic injury vs congestive hepatopathy in the setting of MI with cardiac dysfunction  3.) TATO possibly secondary HRS type 1 vs ischemic ATN  4.) AMS secondary to metabolic encephalopathy in the setting of above + cerebellar stroke    Plan  1.) Cont routine vent management, titrate FIO2 and PEEP as tolerated  2.) F/U post intubation ABG and CXR.  3.) Keep NPO for now.  4.) Cont levophed, titrate to maintain MAP > 65.  5.) F/U code labs and EKG.   6.) Add sedation if needed, goal RASS 0 to -2.  7.) Monitor finger sticks, goal 140-180. Resume free water boluses for hypernatremia now that the patient has an OGT.   8.) Cont further care in the MICU for further management of above.  9.) Full code. Family notified of the recent events and currently at the bedside. They are aware of the patient's critical status. They also understand that he is on maximum life support at this time however, remains at high high risk for another cardiac arrest. They wish to continue all aggressive measures for now as they are waiting for additional family members to arrive from [place] following which they would likely be interested in withdrawing care and pursuing comfort measures. However, they do understand that in the event that the patient has another cardiac arrest he will likely not survive. Prognosis grim.     45 minutes of critical care time exclusive of all procedures. Patient encounter occurred on 1/7, note signed on 1/8.    Called to the bedside for an episode of resp distress. Patient seen and examined. Patient was noted to be tachypneic followed by a brief episode of apnea. Patient immediately went into a PEA arrest. ACLS was initiated and patient was intubated (see resident's code note). ROSC was achieved after a total of 14 minutes of down time following which levophed was initiated for hypotension. Finger stick was unremarkable. Labs from earlier today showed unremarkable lytes.    On exam, patient is intubated but not sedated  BP 100s systolic, MAP 70 on levophed 0.15 mcg, O2 sat 100% on the vent.    Code labs pending. CTH done earlier today again demonstrated the known cerebellar stroke with hemorrhagic conversion without any midline shift or herniation.    Of note, during intubation brown colored gastric contents were suctioned from the OP.    Impression  1.) Cardiac arrest likely secondary to pulmonary arrest most likely in the setting of an aspiration event now requiring MV.  2.) Acute liver failure secondary to ischemic injury vs congestive hepatopathy in the setting of MI with cardiac dysfunction  3.) TATO possibly secondary HRS type 1 vs ischemic ATN  4.) AMS secondary to metabolic encephalopathy in the setting of above + cerebellar stroke    Plan  1.) Cont routine vent management, titrate FIO2 and PEEP as tolerated  2.) F/U post intubation ABG and CXR.  3.) Keep NPO for now.  4.) Cont levophed, titrate to maintain MAP > 65.  5.) F/U code labs and EKG.   6.) Add sedation if needed, goal RASS 0 to -2.  7.) Monitor finger sticks, goal 140-180. Resume free water boluses for hypernatremia now that the patient has an OGT.   8.) Cont further care in the MICU for further management of above.  9.) Full code. Family notified of the recent events and currently at the bedside. They are aware of the patient's critical status. They also understand that he is on maximum life support at this time however, remains at high high risk for another cardiac arrest. They wish to continue all aggressive measures for now as they are waiting for additional family members to arrive from [place] following which they would likely be interested in withdrawing care and pursuing comfort measures. However, they do understand that in the event that the patient has another cardiac arrest he will likely not survive. Prognosis grim.     45 minutes of critical care time exclusive of all procedures. Patient encounter occurred on 1/7, note signed on 1/8.    Called to the bedside for an episode of resp distress. Patient seen and examined. Patient was noted to be tachypneic followed by a brief episode of apnea. Patient immediately went into a PEA arrest. ACLS was initiated and patient was intubated (see resident's code note). ROSC was achieved after a total of 14 minutes of down time following which levophed was initiated for hypotension. Finger stick was unremarkable. Labs from earlier today showed unremarkable lytes.    On exam, patient is intubated but not sedated  BP 100s systolic, MAP 70 on levophed 0.15 mcg, O2 sat 100% on the vent.    Code labs pending. CTH done earlier today again demonstrated the known cerebellar stroke with hemorrhagic conversion without any midline shift or herniation.    Of note, during intubation brown colored gastric contents were suctioned from the OP.    Impression  1.) Cardiac arrest likely secondary to pulmonary arrest most likely in the setting of an aspiration event now requiring MV.  2.) Acute liver failure secondary to ischemic injury vs congestive hepatopathy in the setting of MI with cardiac dysfunction  3.) TATO possibly secondary HRS type 1 vs ischemic ATN  4.) AMS secondary to metabolic encephalopathy in the setting of above + cerebellar stroke  5.) Lactic acidosis secondary to above.     Plan  1.) Cont routine vent management, titrate FIO2 and PEEP as tolerated  2.) F/U post intubation ABG and CXR.  3.) Keep NPO for now.  4.) Cont levophed, titrate to maintain MAP > 65.  5.) F/U code labs and EKG.   6.) Add sedation if needed, goal RASS 0 to -2.  7.) Monitor finger sticks, goal 140-180. Resume free water boluses for hypernatremia now that the patient has an OGT.   8.) Cont further care in the MICU for further management of above.  9.) Full code. Family notified of the recent events and currently at the bedside. They are aware of the patient's critical status. They also understand that he is on maximum life support at this time however, remains at high high risk for another cardiac arrest. They wish to continue all aggressive measures for now as they are waiting for additional family members to arrive from [place] following which they would likely be interested in withdrawing care and pursuing comfort measures. However, they do understand that in the event that the patient has another cardiac arrest he will likely not survive. Prognosis grim.     45 minutes of critical care time exclusive of all procedures.

## 2018-01-09 LAB — SRA INTERP SER-IMP: SIGNIFICANT CHANGE UP

## 2021-11-15 NOTE — PROGRESS NOTE ADULT - PROBLEM SELECTOR PLAN 4
Addended by: ISAIAH ORDAZ on: 11/15/2021 09:10 AM     Modules accepted: Orders    
-recommend to hold off heparin given low possibility of ACS and risk of hemorrhagic conversion of CVA  -Will eventually need ischemic eval, continue ASA.    Await TTE   continue ASA/Metoprolol  Enalapril on hold given TATO
-recommend to hold off heparin given low possibility of ACS and risk of hemorrhagic conversion of CVA  -Will eventually need ischemic eval, continue ASA.    Await TTE   continue ASA/Metoprolol  Enalapril on hold given TATO
-[id number]=7.6%. FS well controlled. Continue with ISS for now and continue to monitor FS closely.   -Consistent carbohydrate diet

## 2021-11-30 NOTE — SWALLOW BEDSIDE ASSESSMENT ADULT - ADDITIONAL RECOMMENDATIONS
Initial Anesthesia Post-op Note    Patient: Silvio Samson  Procedure(s) Performed: LEFT SHOULDER ARTHROSCOPY ROTATOR CUFF REPAIR, LEFT SHOULDER ARTHROSCOPY ACROMIOPLASTY, LEFT SHOULDER ARTHROSCOPY BICEPS TENODESIS, LEFT SHOULDER ARTHROSCOPY DEBRIDEMENT; LEFT SHOULDER ARTHROSCOPY ROTATOR CUFF REPAIR, LEFT SHOULDER ARTHROSCOPY ACROMIOPLASTY, LEFT SHOULDER ARTHROSCOPY BICEPS TENODESIS, LEFT SHOULDER ARTHROSCOPY DEBRIDEMENT; LEFT SHOULDER ARTHROSCOPY ROTATOR CUFF REPAIR, LEFT SHOULDER ARTHROSCOPY ACROMIOPLASTY, LEFT SHOULDER ARTHROSCOPY BICEPS TENODESIS, LEFT SHOULDER ARTHROSCOPY DEBRIDEMENT  Anesthesia type: General    Vitals Value Taken Time   Temp 36.1 11/30/21 1130   Pulse 75 11/30/21 1129   Resp 18 11/30/21 1129   SpO2 100 % 11/30/21 1129   /76 11/30/21 1127   Vitals shown include unvalidated device data.      Patient Location: PACU Phase 1  Post-op Vital Signs:stable  Level of Consciousness: awake and alert  Respiratory Status: spontaneous ventilation  Cardiovascular stable  Hydration: euvolemic  Pain Management: adequately controlled  Handoff: Handoff to receiving nurse was performed and questions were answered  Vomiting: none  Nausea: None  Airway Patency:patent  Post-op Assessment: no complications and patient tolerated procedure well with no complications      No complications documented.  
1. MD to reconsult if changes in medical status/mental status improve for possible diet upgrade.

## 2023-02-16 NOTE — ED PROVIDER NOTE - CPE EDP EYES NORM
PATIENT IS STABLE AND RESTING COMFORTABLY. PATIENT STATES NO OP SITE PAIN. PATIENT MEETS CRITERIA FOR TRANSFER.  
normal...

## 2023-04-25 NOTE — OCCUPATIONAL THERAPY INITIAL EVALUATION ADULT - GENERAL OBSERVATIONS, REHAB EVAL
Quality 176: Tuberculosis Screening Prior To First Course Of Biologic And/Or Immune Response Modifier Therapy: Patient receiving first-time biologic DMARD therapy, TB Screening Performed and Results Interpreted within 12 months
Quality 410: Psoriasis Clinical Response To Oral Systemic Or Biologic Mediations: Patient has been on biologic or system therapy for less than 6 months
Detail Level: Detailed
Pt. received semisupine in bed. No acute distress. +IV, +Tele.

## 2023-11-17 NOTE — OCCUPATIONAL THERAPY INITIAL EVALUATION ADULT - THERAPY FREQUENCY, OT EVAL
1-2x/week/Will continue to follow pt. while inpatient. Valtrex Pregnancy And Lactation Text: this medication is Pregnancy Category B and is considered safe during pregnancy. This medication is not directly found in breast milk but it's metabolite acyclovir is present.